# Patient Record
Sex: MALE | Race: WHITE | Employment: UNEMPLOYED | URBAN - METROPOLITAN AREA
[De-identification: names, ages, dates, MRNs, and addresses within clinical notes are randomized per-mention and may not be internally consistent; named-entity substitution may affect disease eponyms.]

---

## 2020-07-02 ENCOUNTER — HOSPITAL ENCOUNTER (OUTPATIENT)
Age: 35
Setting detail: SPECIMEN
Discharge: HOME | End: 2020-07-02
Payer: COMMERCIAL

## 2020-07-02 LAB
ABSOLUTE EOS #: 0.59 K/UL (ref 0–0.44)
ABSOLUTE IMMATURE GRANULOCYTE: 0.05 K/UL (ref 0–0.3)
ABSOLUTE LYMPH #: 2.45 K/UL (ref 1.1–3.7)
ABSOLUTE MONO #: 0.86 K/UL (ref 0.1–1.2)
ALBUMIN SERPL-MCNC: 4 G/DL (ref 3.5–5.2)
ALBUMIN/GLOBULIN RATIO: 1.2 (ref 1–2.5)
ALP BLD-CCNC: 129 U/L (ref 40–129)
ALT SERPL-CCNC: 80 U/L (ref 5–41)
ANION GAP SERPL CALCULATED.3IONS-SCNC: 15 MMOL/L (ref 9–17)
AST SERPL-CCNC: 72 U/L
BASOPHILS # BLD: 1 % (ref 0–2)
BASOPHILS ABSOLUTE: 0.1 K/UL (ref 0–0.2)
BILIRUB SERPL-MCNC: 0.39 MG/DL (ref 0.3–1.2)
BUN BLDV-MCNC: 15 MG/DL (ref 6–20)
BUN/CREAT BLD: ABNORMAL (ref 9–20)
CALCIUM SERPL-MCNC: 9 MG/DL (ref 8.6–10.4)
CHLORIDE BLD-SCNC: 97 MMOL/L (ref 98–107)
CO2: 20 MMOL/L (ref 20–31)
CREAT SERPL-MCNC: 0.69 MG/DL (ref 0.7–1.2)
DIFFERENTIAL TYPE: ABNORMAL
EOSINOPHILS RELATIVE PERCENT: 7 % (ref 1–4)
GFR AFRICAN AMERICAN: >60 ML/MIN
GFR NON-AFRICAN AMERICAN: >60 ML/MIN
GFR SERPL CREATININE-BSD FRML MDRD: ABNORMAL ML/MIN/{1.73_M2}
GFR SERPL CREATININE-BSD FRML MDRD: ABNORMAL ML/MIN/{1.73_M2}
GLUCOSE BLD-MCNC: 264 MG/DL (ref 70–99)
HCT VFR BLD CALC: 51.7 % (ref 40.7–50.3)
HEMOGLOBIN: 16.3 G/DL (ref 13–17)
IMMATURE GRANULOCYTES: 1 %
LYMPHOCYTES # BLD: 28 % (ref 24–43)
MCH RBC QN AUTO: 29.7 PG (ref 25.2–33.5)
MCHC RBC AUTO-ENTMCNC: 31.5 G/DL (ref 28.4–34.8)
MCV RBC AUTO: 94.3 FL (ref 82.6–102.9)
MONOCYTES # BLD: 10 % (ref 3–12)
NRBC AUTOMATED: 0 PER 100 WBC
PDW BLD-RTO: 12.7 % (ref 11.8–14.4)
PLATELET # BLD: ABNORMAL K/UL (ref 138–453)
PLATELET ESTIMATE: ABNORMAL
PLATELET, FLUORESCENCE: 185 K/UL (ref 138–453)
PLATELET, IMMATURE FRACTION: 3 % (ref 1.1–10.3)
PMV BLD AUTO: ABNORMAL FL (ref 8.1–13.5)
POTASSIUM SERPL-SCNC: 5.3 MMOL/L (ref 3.7–5.3)
RBC # BLD: 5.48 M/UL (ref 4.21–5.77)
RBC # BLD: ABNORMAL 10*6/UL
SEG NEUTROPHILS: 53 % (ref 36–65)
SEGMENTED NEUTROPHILS ABSOLUTE COUNT: 4.6 K/UL (ref 1.5–8.1)
SODIUM BLD-SCNC: 132 MMOL/L (ref 135–144)
TOTAL PROTEIN: 7.4 G/DL (ref 6.4–8.3)
WBC # BLD: 8.7 K/UL (ref 3.5–11.3)
WBC # BLD: ABNORMAL 10*3/UL

## 2020-12-27 ENCOUNTER — HOSPITAL ENCOUNTER (INPATIENT)
Age: 35
LOS: 4 days | Discharge: LEFT BEFORE DISCHARGE | DRG: 710 | End: 2020-12-31
Attending: EMERGENCY MEDICINE | Admitting: INTERNAL MEDICINE
Payer: MEDICARE

## 2020-12-27 DIAGNOSIS — G06.2 EPIDURAL ABSCESS: Primary | ICD-10-CM

## 2020-12-27 PROBLEM — E11.9 TYPE 2 DIABETES MELLITUS (HCC): Status: ACTIVE | Noted: 2020-12-27

## 2020-12-27 PROBLEM — F19.10 IV DRUG ABUSE (HCC): Status: ACTIVE | Noted: 2020-12-27

## 2020-12-27 LAB
-: ABNORMAL
ABSOLUTE EOS #: 0 K/UL (ref 0–0.4)
ABSOLUTE IMMATURE GRANULOCYTE: 0 K/UL (ref 0–0.3)
ABSOLUTE LYMPH #: 1.49 K/UL (ref 1–4.8)
ABSOLUTE MONO #: 1.66 K/UL (ref 0.1–0.8)
ALBUMIN SERPL-MCNC: 3 G/DL (ref 3.5–5.2)
ALBUMIN/GLOBULIN RATIO: 0.8 (ref 1–2.5)
ALP BLD-CCNC: 120 U/L (ref 40–129)
ALT SERPL-CCNC: 25 U/L (ref 5–41)
AMORPHOUS: ABNORMAL
ANION GAP SERPL CALCULATED.3IONS-SCNC: 15 MMOL/L (ref 9–17)
AST SERPL-CCNC: 17 U/L
BACTERIA: ABNORMAL
BASOPHILS # BLD: 1 % (ref 0–2)
BASOPHILS ABSOLUTE: 0.17 K/UL (ref 0–0.2)
BILIRUB SERPL-MCNC: 0.45 MG/DL (ref 0.3–1.2)
BILIRUBIN URINE: NEGATIVE
BUN BLDV-MCNC: 14 MG/DL (ref 6–20)
BUN/CREAT BLD: ABNORMAL (ref 9–20)
C-REACTIVE PROTEIN: 85.4 MG/L (ref 0–5)
CALCIUM SERPL-MCNC: 8.5 MG/DL (ref 8.6–10.4)
CASTS UA: ABNORMAL /LPF (ref 0–8)
CHLORIDE BLD-SCNC: 94 MMOL/L (ref 98–107)
CO2: 22 MMOL/L (ref 20–31)
COLOR: YELLOW
CREAT SERPL-MCNC: 0.57 MG/DL (ref 0.7–1.2)
CRYSTALS, UA: ABNORMAL /HPF
CULTURE: NORMAL
DIFFERENTIAL TYPE: ABNORMAL
DIRECT EXAM: NORMAL
DIRECT EXAM: NORMAL
EOSINOPHILS RELATIVE PERCENT: 0 % (ref 1–4)
EPITHELIAL CELLS UA: ABNORMAL /HPF (ref 0–5)
ESTIMATED AVERAGE GLUCOSE: 312 MG/DL
GFR AFRICAN AMERICAN: >60 ML/MIN
GFR NON-AFRICAN AMERICAN: >60 ML/MIN
GFR SERPL CREATININE-BSD FRML MDRD: ABNORMAL ML/MIN/{1.73_M2}
GFR SERPL CREATININE-BSD FRML MDRD: ABNORMAL ML/MIN/{1.73_M2}
GLUCOSE BLD-MCNC: 265 MG/DL (ref 75–110)
GLUCOSE BLD-MCNC: 275 MG/DL (ref 75–110)
GLUCOSE BLD-MCNC: 301 MG/DL (ref 70–99)
GLUCOSE URINE: ABNORMAL
HBA1C MFR BLD: 12.5 % (ref 4–6)
HCT VFR BLD CALC: 40.2 % (ref 40.7–50.3)
HEMOGLOBIN: 13.4 G/DL (ref 13–17)
IMMATURE GRANULOCYTES: 0 %
INR BLD: 0.9
KETONES, URINE: ABNORMAL
LEUKOCYTE ESTERASE, URINE: NEGATIVE
LYMPHOCYTES # BLD: 9 % (ref 24–44)
Lab: NORMAL
Lab: NORMAL
MCH RBC QN AUTO: 29.3 PG (ref 25.2–33.5)
MCHC RBC AUTO-ENTMCNC: 33.3 G/DL (ref 28.4–34.8)
MCV RBC AUTO: 87.8 FL (ref 82.6–102.9)
MONOCYTES # BLD: 10 % (ref 1–7)
MORPHOLOGY: NORMAL
MUCUS: ABNORMAL
NITRITE, URINE: NEGATIVE
NRBC AUTOMATED: 0 PER 100 WBC
OTHER OBSERVATIONS UA: ABNORMAL
PARTIAL THROMBOPLASTIN TIME: 24 SEC (ref 20.5–30.5)
PDW BLD-RTO: 11.9 % (ref 11.8–14.4)
PH UA: 5.5 (ref 5–8)
PLATELET # BLD: 312 K/UL (ref 138–453)
PLATELET ESTIMATE: ABNORMAL
PMV BLD AUTO: 10.7 FL (ref 8.1–13.5)
POTASSIUM SERPL-SCNC: 4 MMOL/L (ref 3.7–5.3)
PROCALCITONIN: 0.28 NG/ML
PROTEIN UA: NEGATIVE
PROTHROMBIN TIME: 9.9 SEC (ref 9–12)
RBC # BLD: 4.58 M/UL (ref 4.21–5.77)
RBC # BLD: ABNORMAL 10*6/UL
RBC UA: ABNORMAL /HPF (ref 0–4)
RENAL EPITHELIAL, UA: ABNORMAL /HPF
SARS-COV-2, RAPID: NOT DETECTED
SARS-COV-2: NORMAL
SARS-COV-2: NORMAL
SEDIMENTATION RATE, ERYTHROCYTE: 64 MM (ref 0–15)
SEG NEUTROPHILS: 80 % (ref 36–66)
SEGMENTED NEUTROPHILS ABSOLUTE COUNT: 13.28 K/UL (ref 1.8–7.7)
SODIUM BLD-SCNC: 131 MMOL/L (ref 135–144)
SOURCE: NORMAL
SPECIFIC GRAVITY UA: 1.04 (ref 1–1.03)
SPECIMEN DESCRIPTION: NORMAL
SPECIMEN DESCRIPTION: NORMAL
TOTAL PROTEIN: 6.8 G/DL (ref 6.4–8.3)
TRICHOMONAS: ABNORMAL
TURBIDITY: CLEAR
URINE HGB: NEGATIVE
UROBILINOGEN, URINE: NORMAL
WBC # BLD: 16.6 K/UL (ref 3.5–11.3)
WBC # BLD: ABNORMAL 10*3/UL
WBC UA: ABNORMAL /HPF (ref 0–5)
YEAST: ABNORMAL

## 2020-12-27 PROCEDURE — 87086 URINE CULTURE/COLONY COUNT: CPT

## 2020-12-27 PROCEDURE — 87040 BLOOD CULTURE FOR BACTERIA: CPT

## 2020-12-27 PROCEDURE — 86403 PARTICLE AGGLUT ANTBDY SCRN: CPT

## 2020-12-27 PROCEDURE — 83605 ASSAY OF LACTIC ACID: CPT

## 2020-12-27 PROCEDURE — 86140 C-REACTIVE PROTEIN: CPT

## 2020-12-27 PROCEDURE — 85652 RBC SED RATE AUTOMATED: CPT

## 2020-12-27 PROCEDURE — U0002 COVID-19 LAB TEST NON-CDC: HCPCS

## 2020-12-27 PROCEDURE — 80053 COMPREHEN METABOLIC PANEL: CPT

## 2020-12-27 PROCEDURE — 6360000002 HC RX W HCPCS: Performed by: STUDENT IN AN ORGANIZED HEALTH CARE EDUCATION/TRAINING PROGRAM

## 2020-12-27 PROCEDURE — 87075 CULTR BACTERIA EXCEPT BLOOD: CPT

## 2020-12-27 PROCEDURE — 86920 COMPATIBILITY TEST SPIN: CPT

## 2020-12-27 PROCEDURE — 87186 SC STD MICRODIL/AGAR DIL: CPT

## 2020-12-27 PROCEDURE — 85025 COMPLETE CBC W/AUTO DIFF WBC: CPT

## 2020-12-27 PROCEDURE — 1200000000 HC SEMI PRIVATE

## 2020-12-27 PROCEDURE — 85610 PROTHROMBIN TIME: CPT

## 2020-12-27 PROCEDURE — 87070 CULTURE OTHR SPECIMN AEROBIC: CPT

## 2020-12-27 PROCEDURE — 83036 HEMOGLOBIN GLYCOSYLATED A1C: CPT

## 2020-12-27 PROCEDURE — 82947 ASSAY GLUCOSE BLOOD QUANT: CPT

## 2020-12-27 PROCEDURE — U0003 INFECTIOUS AGENT DETECTION BY NUCLEIC ACID (DNA OR RNA); SEVERE ACUTE RESPIRATORY SYNDROME CORONAVIRUS 2 (SARS-COV-2) (CORONAVIRUS DISEASE [COVID-19]), AMPLIFIED PROBE TECHNIQUE, MAKING USE OF HIGH THROUGHPUT TECHNOLOGIES AS DESCRIBED BY CMS-2020-01-R: HCPCS

## 2020-12-27 PROCEDURE — 2580000003 HC RX 258: Performed by: STUDENT IN AN ORGANIZED HEALTH CARE EDUCATION/TRAINING PROGRAM

## 2020-12-27 PROCEDURE — 84145 PROCALCITONIN (PCT): CPT

## 2020-12-27 PROCEDURE — 6360000002 HC RX W HCPCS: Performed by: INTERNAL MEDICINE

## 2020-12-27 PROCEDURE — 86850 RBC ANTIBODY SCREEN: CPT

## 2020-12-27 PROCEDURE — 99223 1ST HOSP IP/OBS HIGH 75: CPT | Performed by: INTERNAL MEDICINE

## 2020-12-27 PROCEDURE — 93005 ELECTROCARDIOGRAM TRACING: CPT

## 2020-12-27 PROCEDURE — 2580000003 HC RX 258: Performed by: INTERNAL MEDICINE

## 2020-12-27 PROCEDURE — 86900 BLOOD TYPING SEROLOGIC ABO: CPT

## 2020-12-27 PROCEDURE — 85730 THROMBOPLASTIN TIME PARTIAL: CPT

## 2020-12-27 PROCEDURE — 81001 URINALYSIS AUTO W/SCOPE: CPT

## 2020-12-27 PROCEDURE — 86901 BLOOD TYPING SEROLOGIC RH(D): CPT

## 2020-12-27 PROCEDURE — 87147 CULTURE TYPE IMMUNOLOGIC: CPT

## 2020-12-27 PROCEDURE — 99284 EMERGENCY DEPT VISIT MOD MDM: CPT

## 2020-12-27 PROCEDURE — 86341 ISLET CELL ANTIBODY: CPT

## 2020-12-27 PROCEDURE — 87205 SMEAR GRAM STAIN: CPT

## 2020-12-27 PROCEDURE — 99254 IP/OBS CNSLTJ NEW/EST MOD 60: CPT | Performed by: INTERNAL MEDICINE

## 2020-12-27 PROCEDURE — 6370000000 HC RX 637 (ALT 250 FOR IP): Performed by: STUDENT IN AN ORGANIZED HEALTH CARE EDUCATION/TRAINING PROGRAM

## 2020-12-27 RX ORDER — INSULIN GLARGINE 100 [IU]/ML
15 INJECTION, SOLUTION SUBCUTANEOUS NIGHTLY
Status: DISCONTINUED | OUTPATIENT
Start: 2020-12-27 | End: 2020-12-28

## 2020-12-27 RX ORDER — MAGNESIUM SULFATE 1 G/100ML
1 INJECTION INTRAVENOUS PRN
Status: DISCONTINUED | OUTPATIENT
Start: 2020-12-27 | End: 2020-12-31 | Stop reason: HOSPADM

## 2020-12-27 RX ORDER — MORPHINE SULFATE 4 MG/ML
2 INJECTION, SOLUTION INTRAMUSCULAR; INTRAVENOUS EVERY 4 HOURS PRN
Status: DISCONTINUED | OUTPATIENT
Start: 2020-12-27 | End: 2020-12-29

## 2020-12-27 RX ORDER — ONDANSETRON 2 MG/ML
4 INJECTION INTRAMUSCULAR; INTRAVENOUS EVERY 6 HOURS PRN
Status: DISCONTINUED | OUTPATIENT
Start: 2020-12-27 | End: 2020-12-31 | Stop reason: HOSPADM

## 2020-12-27 RX ORDER — PROMETHAZINE HYDROCHLORIDE 12.5 MG/1
12.5 TABLET ORAL EVERY 6 HOURS PRN
Status: DISCONTINUED | OUTPATIENT
Start: 2020-12-27 | End: 2020-12-31 | Stop reason: HOSPADM

## 2020-12-27 RX ORDER — PROMETHAZINE HYDROCHLORIDE 25 MG/ML
6.25 INJECTION, SOLUTION INTRAMUSCULAR; INTRAVENOUS ONCE
Status: COMPLETED | OUTPATIENT
Start: 2020-12-27 | End: 2020-12-27

## 2020-12-27 RX ORDER — NICOTINE POLACRILEX 4 MG
15 LOZENGE BUCCAL PRN
Status: DISCONTINUED | OUTPATIENT
Start: 2020-12-27 | End: 2020-12-31 | Stop reason: HOSPADM

## 2020-12-27 RX ORDER — POLYETHYLENE GLYCOL 3350 17 G/17G
17 POWDER, FOR SOLUTION ORAL DAILY PRN
Status: DISCONTINUED | OUTPATIENT
Start: 2020-12-27 | End: 2020-12-29

## 2020-12-27 RX ORDER — FENTANYL CITRATE 50 UG/ML
25 INJECTION, SOLUTION INTRAMUSCULAR; INTRAVENOUS
Status: DISCONTINUED | OUTPATIENT
Start: 2020-12-27 | End: 2020-12-31 | Stop reason: HOSPADM

## 2020-12-27 RX ORDER — 0.9 % SODIUM CHLORIDE 0.9 %
20 INTRAVENOUS SOLUTION INTRAVENOUS ONCE
Status: DISCONTINUED | OUTPATIENT
Start: 2020-12-27 | End: 2020-12-31 | Stop reason: HOSPADM

## 2020-12-27 RX ORDER — LANOLIN ALCOHOL/MO/W.PET/CERES
3 CREAM (GRAM) TOPICAL NIGHTLY PRN
Status: DISCONTINUED | OUTPATIENT
Start: 2020-12-27 | End: 2020-12-31 | Stop reason: HOSPADM

## 2020-12-27 RX ORDER — POTASSIUM CHLORIDE 20 MEQ/1
40 TABLET, EXTENDED RELEASE ORAL PRN
Status: DISCONTINUED | OUTPATIENT
Start: 2020-12-27 | End: 2020-12-31 | Stop reason: HOSPADM

## 2020-12-27 RX ORDER — SODIUM CHLORIDE 0.9 % (FLUSH) 0.9 %
10 SYRINGE (ML) INJECTION PRN
Status: DISCONTINUED | OUTPATIENT
Start: 2020-12-27 | End: 2020-12-31 | Stop reason: HOSPADM

## 2020-12-27 RX ORDER — DEXTROSE MONOHYDRATE 25 G/50ML
12.5 INJECTION, SOLUTION INTRAVENOUS PRN
Status: DISCONTINUED | OUTPATIENT
Start: 2020-12-27 | End: 2020-12-31 | Stop reason: HOSPADM

## 2020-12-27 RX ORDER — MORPHINE SULFATE 4 MG/ML
2 INJECTION, SOLUTION INTRAMUSCULAR; INTRAVENOUS EVERY 4 HOURS PRN
Status: DISCONTINUED | OUTPATIENT
Start: 2020-12-27 | End: 2020-12-27

## 2020-12-27 RX ORDER — SODIUM CHLORIDE 9 MG/ML
INJECTION, SOLUTION INTRAVENOUS CONTINUOUS
Status: DISCONTINUED | OUTPATIENT
Start: 2020-12-27 | End: 2020-12-27

## 2020-12-27 RX ORDER — SODIUM CHLORIDE 0.9 % (FLUSH) 0.9 %
10 SYRINGE (ML) INJECTION EVERY 12 HOURS SCHEDULED
Status: DISCONTINUED | OUTPATIENT
Start: 2020-12-27 | End: 2020-12-31 | Stop reason: HOSPADM

## 2020-12-27 RX ORDER — POTASSIUM CHLORIDE 7.45 MG/ML
10 INJECTION INTRAVENOUS PRN
Status: DISCONTINUED | OUTPATIENT
Start: 2020-12-27 | End: 2020-12-31 | Stop reason: HOSPADM

## 2020-12-27 RX ORDER — ACETAMINOPHEN 325 MG/1
650 TABLET ORAL EVERY 6 HOURS PRN
Status: DISCONTINUED | OUTPATIENT
Start: 2020-12-27 | End: 2020-12-31 | Stop reason: HOSPADM

## 2020-12-27 RX ORDER — SODIUM CHLORIDE 9 MG/ML
INJECTION, SOLUTION INTRAVENOUS CONTINUOUS
Status: DISCONTINUED | OUTPATIENT
Start: 2020-12-27 | End: 2020-12-28

## 2020-12-27 RX ORDER — ACETAMINOPHEN 650 MG/1
650 SUPPOSITORY RECTAL EVERY 6 HOURS PRN
Status: DISCONTINUED | OUTPATIENT
Start: 2020-12-27 | End: 2020-12-31 | Stop reason: HOSPADM

## 2020-12-27 RX ORDER — NICOTINE 21 MG/24HR
1 PATCH, TRANSDERMAL 24 HOURS TRANSDERMAL DAILY PRN
Status: DISCONTINUED | OUTPATIENT
Start: 2020-12-27 | End: 2020-12-31 | Stop reason: HOSPADM

## 2020-12-27 RX ORDER — DEXTROSE MONOHYDRATE 50 MG/ML
100 INJECTION, SOLUTION INTRAVENOUS PRN
Status: DISCONTINUED | OUTPATIENT
Start: 2020-12-27 | End: 2020-12-31 | Stop reason: HOSPADM

## 2020-12-27 RX ORDER — PANTOPRAZOLE SODIUM 40 MG/1
40 TABLET, DELAYED RELEASE ORAL
Status: DISCONTINUED | OUTPATIENT
Start: 2020-12-28 | End: 2020-12-30

## 2020-12-27 RX ADMIN — MORPHINE SULFATE 2 MG: 4 INJECTION, SOLUTION INTRAMUSCULAR; INTRAVENOUS at 20:44

## 2020-12-27 RX ADMIN — SODIUM CHLORIDE: 9 INJECTION, SOLUTION INTRAVENOUS at 10:11

## 2020-12-27 RX ADMIN — FENTANYL CITRATE 25 MCG: 50 INJECTION, SOLUTION INTRAMUSCULAR; INTRAVENOUS at 22:53

## 2020-12-27 RX ADMIN — SODIUM CHLORIDE, PRESERVATIVE FREE 10 ML: 5 INJECTION INTRAVENOUS at 10:12

## 2020-12-27 RX ADMIN — FENTANYL CITRATE 25 MCG: 50 INJECTION, SOLUTION INTRAMUSCULAR; INTRAVENOUS at 15:27

## 2020-12-27 RX ADMIN — Medication 3 MG: at 20:44

## 2020-12-27 RX ADMIN — Medication 1250 MG: at 18:57

## 2020-12-27 RX ADMIN — FENTANYL CITRATE 25 MCG: 50 INJECTION, SOLUTION INTRAMUSCULAR; INTRAVENOUS at 12:53

## 2020-12-27 RX ADMIN — ONDANSETRON 4 MG: 2 INJECTION INTRAMUSCULAR; INTRAVENOUS at 20:43

## 2020-12-27 RX ADMIN — CEFEPIME HYDROCHLORIDE 2 G: 2 INJECTION, POWDER, FOR SOLUTION INTRAVENOUS at 11:44

## 2020-12-27 RX ADMIN — PROMETHAZINE HYDROCHLORIDE 6.25 MG: 25 INJECTION INTRAMUSCULAR; INTRAVENOUS at 22:53

## 2020-12-27 ASSESSMENT — ENCOUNTER SYMPTOMS
DIARRHEA: 0
ABDOMINAL PAIN: 0
ABDOMINAL DISTENTION: 0
VOMITING: 0
BLOOD IN STOOL: 0
CONSTIPATION: 0
NAUSEA: 0
BACK PAIN: 1

## 2020-12-27 ASSESSMENT — PAIN SCALES - GENERAL
PAINLEVEL_OUTOF10: 9
PAINLEVEL_OUTOF10: 10
PAINLEVEL_OUTOF10: 8

## 2020-12-27 ASSESSMENT — PAIN DESCRIPTION - PAIN TYPE: TYPE: ACUTE PAIN

## 2020-12-27 ASSESSMENT — PAIN DESCRIPTION - LOCATION: LOCATION: BACK

## 2020-12-27 NOTE — ED NOTES
Labeled blood specimen collected and sent to lab via tube system.   COVID swab sent to lab     Ronn Weaver RN  12/27/20 3246

## 2020-12-27 NOTE — ED NOTES
Pt asking multiple times about eating. Writer informed pt that writer is attempting to get a hold of admitting team regarding inital NPO orders. Pt states \"I'm about ready to leave, its been 48 hours since I last ate anything\". Writer perfect served admitting team and awaiting response.       Oksana Fontanez RN  12/27/20 0635
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
## 2020-12-27 NOTE — CONSULTS
Neurosurgery Consult Note    Reason for Consult:  Epidural abscess     History Obtained From:  patient, electronic medical record    CHIEF COMPLAINT:       Back pain    HISTORY OF PRESENT ILLNESS:       The patient is a 38 y.o. male with history of IV drug abuse who presented as transfer from [facility] with thoracic epidural abscess with central canal narrowing and cord signal T10-T12. Patient has ongoing severe back pain over the last couple of days. He denies weakness numbness or difficulty walking. No fecal or urinary incontinence. No fever or chills. No headache. No confusion or altered mental status. PAST MEDICAL HISTORY :       Past Medical History:    No past medical history on file. Past Surgical History:    No past surgical history on file.     Social History:   Social History     Socioeconomic History    Marital status: Unknown     Spouse name: Not on file    Number of children: Not on file    Years of education: Not on file    Highest education level: Not on file   Occupational History    Not on file   Social Needs    Financial resource strain: Not on file    Food insecurity     Worry: Not on file     Inability: Not on file    Transportation needs     Medical: Not on file     Non-medical: Not on file   Tobacco Use    Smoking status: Not on file   Substance and Sexual Activity    Alcohol use: Not on file    Drug use: Not on file    Sexual activity: Not on file   Lifestyle    Physical activity     Days per week: Not on file     Minutes per session: Not on file    Stress: Not on file   Relationships    Social connections     Talks on phone: Not on file     Gets together: Not on file     Attends Mormon service: Not on file     Active member of club or organization: Not on file     Attends meetings of clubs or organizations: Not on file     Relationship status: Not on file    Intimate partner violence Fear of current or ex partner: Not on file     Emotionally abused: Not on file     Physically abused: Not on file     Forced sexual activity: Not on file   Other Topics Concern    Not on file   Social History Narrative    Not on file       Family History:   No family history on file. Allergies:  Patient has no known allergies.     Home Medications:  Prior to Admission medications    Not on File       Current Medications:   Current Facility-Administered Medications: sodium chloride flush 0.9 % injection 10 mL, 10 mL, Intravenous, 2 times per day  sodium chloride flush 0.9 % injection 10 mL, 10 mL, Intravenous, PRN  potassium chloride (KLOR-CON M) extended release tablet 40 mEq, 40 mEq, Oral, PRN **OR** potassium bicarb-citric acid (EFFER-K) effervescent tablet 40 mEq, 40 mEq, Oral, PRN **OR** potassium chloride 10 mEq/100 mL IVPB (Peripheral Line), 10 mEq, Intravenous, PRN  magnesium sulfate 1 g in dextrose 5% 100 mL IVPB, 1 g, Intravenous, PRN  enoxaparin (LOVENOX) injection 40 mg, 40 mg, Subcutaneous, Daily  promethazine (PHENERGAN) tablet 12.5 mg, 12.5 mg, Oral, Q6H PRN **OR** ondansetron (ZOFRAN) injection 4 mg, 4 mg, Intravenous, Q6H PRN  polyethylene glycol (GLYCOLAX) packet 17 g, 17 g, Oral, Daily PRN  nicotine (NICODERM CQ) 21 MG/24HR 1 patch, 1 patch, Transdermal, Daily PRN  acetaminophen (TYLENOL) tablet 650 mg, 650 mg, Oral, Q6H PRN **OR** acetaminophen (TYLENOL) suppository 650 mg, 650 mg, Rectal, Q6H PRN  vancomycin (VANCOCIN) 1250 mg in dextrose 5 % 250 mL IVPB, 15 mg/kg, Intravenous, Q12H  0.9 % sodium chloride infusion, , Intravenous, Continuous  fentaNYL (SUBLIMAZE) injection 25 mcg, 25 mcg, Intravenous, Q2H PRN  morphine injection 2 mg, 2 mg, Intravenous, Q4H PRN  cefepime (MAXIPIME) 2 g IVPB minibag, 2 g, Intravenous, Q8H  0.9 % sodium chloride bolus, 20 mL, Intravenous, Once        PHYSICAL EXAM: /77   Pulse 67   Temp 98.1 °F (36.7 °C) (Oral)   Resp 18   Ht 5' 8\" (1.727 m)   Wt 169 lb (76.7 kg)   SpO2 100%   BMI 25.70 kg/m²       Neurologic Exam     Mental Status   Oriented to person, place, and time. Cranial Nerves   Cranial nerves II through XII intact. Motor Exam     Strength   Strength 5/5 throughout.      Sensory Exam   Right arm light touch: normal  Left arm light touch: normal  Right arm pinprick: normal  Left arm pinprick: normal  No saddle anesthesia  Normal rectal tone      Gait, Coordination, and Reflexes     Reflexes   Right biceps: 2+  Left biceps: 2+  Right triceps: 2+  Left triceps: 2+  Right patellar: 2+  Left patellar: 2+  Right achilles: 2+  Left achilles: 2+  Right plantar: normal  Left plantar: normal  Right Vuong: absent  Left Vuong: absent  Right ankle clonus: absent  Left ankle clonus: absent             LABS AND IMAGING:     CBC with Differential:    Lab Results   Component Value Date    WBC 16.6 12/27/2020    RBC 4.58 12/27/2020    HGB 13.4 12/27/2020    HCT 40.2 12/27/2020     12/27/2020    MCV 87.8 12/27/2020    MCH 29.3 12/27/2020    MCHC 33.3 12/27/2020    RDW 11.9 12/27/2020    LYMPHOPCT 9 12/27/2020    MONOPCT 10 12/27/2020    BASOPCT 1 12/27/2020    MONOSABS 1.66 12/27/2020    LYMPHSABS 1.49 12/27/2020    EOSABS 0.00 12/27/2020    BASOSABS 0.17 12/27/2020    DIFFTYPE NOT REPORTED 12/27/2020     BMP:    Lab Results   Component Value Date     12/27/2020    K 4.0 12/27/2020    CL 94 12/27/2020    CO2 22 12/27/2020    BUN 14 12/27/2020    LABALBU 3.0 12/27/2020    CREATININE 0.57 12/27/2020    CALCIUM 8.5 12/27/2020    GFRAA >60 12/27/2020    LABGLOM >60 12/27/2020    GLUCOSE 301 12/27/2020       Radiology Review: MRI T-spine: T10-T11 epidural collection with central canal narrowing with increased cord signal concerning for cord compression      ASSESSMENT AND PLAN:       Thoracic epidural abscess MRI concerning for cord compression. No clinical signs of myelopathy    N.p.o. after midnight for surgical decompression  ID consult for antibiotic management  Internal medicine for surgical clearance      Please contact neurosurgery with any changes in patient's neurological status, any questions or concerns. Brittany Dominique  Neurology Resident, PGY-3       Electronically signed by Renee Wilkerson MD on 12/27/20 at 11:20 AM EST      This note is created with the assistance of a speech recognition program.  While intending to generate a document that actually reflects the content of the visit, the document can still have some errors including those of syntax and sound a like substitutions which may escape proof reading. In such instances, actual meaning can be extrapolated by contextual diversion.

## 2020-12-27 NOTE — ED NOTES
Labeled urine specimen collected and sent to lab via tube system.        Venkata Willams RN  12/27/20 7473

## 2020-12-27 NOTE — ED PROVIDER NOTES
[facility]     Emergency Department     Faculty Attestation    I performed a history and physical examination of the patient and discussed management with the resident. I reviewed the resident´s note and agree with the documented findings and plan of care. Any areas of disagreement are noted on the chart. I was personally present for the key portions of any procedures. I have documented in the chart those procedures where I was not present during the key portions. I have reviewed the emergency nurses triage note. I agree with the chief complaint, past medical history, past surgical history, allergies, medications, social and family history as documented unless otherwise noted below. For Physician Assistant/ Nurse Practitioner cases/documentation I have personally evaluated this patient and have completed at least one if not all key elements of the E/M (history, physical exam, and MDM). Additional findings are as noted. Transfer note:  [place] Officer   1985  37 yo male   Known IVDA   Kidney stone for a few days. Went to another ED then   Complaining of severe back pain  Put central line while sitting   Epidural abscess T9-T11 w/ cord compression w/ septic arthritis   IJ   WBC of 14 and CRP of 9  Del and Gaurav Herrera MD  12/27/20 0735    Chest clear, heart exam normal without murmurs, good muscle strength in the lower extremities, pupils 3 mm and reactive. Awake and alert, good airway.        Ann Herrera MD  12/27/20 9948       EKG Interpretation    Interpreted by emergency department physician    Rhythm: normal sinus   Rate: normal/67  Axis: normal  Ectopy: none  Conduction: Short NH of 96 ms without delta wave  ST Segments: no acute change  T Waves: no acute change  Q Waves: none    Clinical Impression: Normal EKG except for short NH    Ann Herrera, III       Ann Herrera MD  12/27/20 4036

## 2020-12-27 NOTE — ED NOTES
Elizabeth León   1985  39 yo male   Known IVDA   Kidney stone for a few days.   Went to another ED then   Complaining of severe back pain  Put central line while sitting   Epidural abscess T9-T11 w/ cord compression w/ septic arthritis   IJ   WBC of 14 and CRP of 9  Del and Gaurav     Report received and written by Dr Crystal Mckeon, RN  12/27/20 5928

## 2020-12-27 NOTE — CONSULTS
The patient had blood cultures obtained and he has been placed on Zosyn and vancomycin pending further data. At [facility]. [facility]ik's he is being continued on vancomycin on place on cefepime. He has a prior history of MRSA. His Covid test was negative on 12/27/2020. Labs, X rays reviewed: 12/27/2020    BUN: 14  Cr:0.57    WBC: 16.6  Hb: 13.4  Plat: 312    Cultures:  Urine:  · 12-27-20 pending  Blood:  · 12-27-20 pending  Sputum :  ·   Wound:  ·     Discussed with patient, RN, NS. I have personally reviewed the past medical history, past surgical history, medications, social history, and family history, and I have updated the database accordingly. Past Medical History:   No past medical history on file. Past Surgical  History:   No past surgical history on file.     Medications:      sodium chloride flush  10 mL Intravenous 2 times per day    enoxaparin  40 mg Subcutaneous Daily    vancomycin  15 mg/kg Intravenous Q12H    cefepime  2 g Intravenous Q8H    sodium chloride  20 mL Intravenous Once       Social History:     Social History     Socioeconomic History    Marital status: Unknown     Spouse name: Not on file    Number of children: Not on file    Years of education: Not on file    Highest education level: Not on file   Occupational History    Not on file   Social Needs    Financial resource strain: Not on file    Food insecurity     Worry: Not on file     Inability: Not on file    Transportation needs     Medical: Not on file     Non-medical: Not on file   Tobacco Use    Smoking status: Not on file   Substance and Sexual Activity    Alcohol use: Not on file    Drug use: Not on file    Sexual activity: Not on file   Lifestyle    Physical activity     Days per week: Not on file     Minutes per session: Not on file    Stress: Not on file   Relationships    Social connections     Talks on phone: Not on file     Gets together: Not on file     Attends Gnosticism service: Not on file Active member of club or organization: Not on file     Attends meetings of clubs or organizations: Not on file     Relationship status: Not on file    Intimate partner violence     Fear of current or ex partner: Not on file     Emotionally abused: Not on file     Physically abused: Not on file     Forced sexual activity: Not on file   Other Topics Concern    Not on file   Social History Narrative    Not on file       Family History:   No family history on file. Allergies:   Patient has no known allergies. Review of Systems:   Constitutional: No fevers or chills. No systemic complaints  Head: No headaches  Eyes: No double vision or blurry vision. No conjunctival inflammation. ENT: No sore throat or runny nose. . No hearing loss, tinnitus or vertigo. Cardiovascular: No chest pain or palpitations. No shortness of breath. No ALMEIDA  Lung: No shortness of breath or cough. No sputum production  Abdomen: No nausea, vomiting, diarrhea, or abdominal pain. Wava Prim No cramps. Genitourinary: No increased urinary frequency, or dysuria. No hematuria. No suprapubic or CVA pain  Musculoskeletal: No muscle aches or pains. No joint effusions, swelling or deformities. Back pain in the thoracic area  Hematologic: No bleeding or bruising. Neurologic: No headache, weakness, numbness, or tingling. Integument: No rash, no ulcers. Psychiatric: No depression. Endocrine: No polyuria, no polydipsia, no polyphagia.     Physical Examination :     Patient Vitals for the past 8 hrs:   BP Temp Temp src Pulse Resp SpO2 Height Weight   12/27/20 1116 (!) 153/83   70  96 %     12/27/20 1046 (!) 151/85   71 29 98 %     12/27/20 0948 (!) 142/87   68 18 96 %     12/27/20 0846 (!) 152/91   72 25 98 %     12/27/20 0816 136/74   68 18 99 %     12/27/20 0746 122/76   74 25 98 %     12/27/20 0742 133/77 98.1 °F (36.7 °C) Oral 67 18 100 % 5' 8\" (1.727 m) 169 lb (76.7 kg) General Appearance: Awake, alert, and in no apparent distress  Head:  Normocephalic, no trauma  Eyes: Pupils equal, round, reactive to light and accommodation; extraocular movements intact; sclera anicteric; conjunctivae pink. No embolic phenomena. ENT: Oropharynx clear, without erythema, exudate, or thrush. No tenderness of sinuses. Mouth/throat: mucosa pink and moist. No lesions. Dentition in good repair. Neck:Supple, without lymphadenopathy. Thyroid normal, No bruits. Pulmonary/Chest: Clear to auscultation, without wheezes, rales, or rhonchi. No dullness to percussion. Cardiovascular: Regular rate and rhythm without murmurs, rubs, or gallops. Abdomen: Soft, non tender. Bowel sounds normal. No organomegaly  All four Extremities: No cyanosis, clubbing, edema, or effusions. Neurologic: No gross sensory or motor deficits. Skin: Warm and dry with good turgor. No signs of peripheral arterial or venous insufficiency. No ulcerations. No open wounds. Medical Decision Making -Laboratory:   I have independently reviewed/ordered the following labs:    CBC with Differential:   Recent Labs     12/27/20 0812   WBC 16.6*   HGB 13.4   HCT 40.2*      LYMPHOPCT 9*   MONOPCT 10*     BMP:   Recent Labs     12/27/20  0812   *   K 4.0   CL 94*   CO2 22   BUN 14   CREATININE 0.57*     Hepatic Function Panel:   Recent Labs     12/27/20  0812   PROT 6.8   LABALBU 3.0*   BILITOT 0.45   ALKPHOS 120   ALT 25   AST 17     No results for input(s): RPR in the last 72 hours. No results for input(s): HIV in the last 72 hours. No results for input(s): BC in the last 72 hours.   Lab Results   Component Value Date    MUCUS NOT REPORTED 12/27/2020    RBC 4.58 12/27/2020    TRICHOMONAS NOT REPORTED 12/27/2020    WBC 16.6 12/27/2020    YEAST NOT REPORTED 12/27/2020    TURBIDITY CLEAR 12/27/2020     Lab Results   Component Value Date    CREATININE 0.57 12/27/2020    GLUCOSE 301 12/27/2020 Medical Decision Making-Imaging:       Medical Decision [id number]:       Medical Decision Making-Other:     Note:  · Labs, medications, radiologic studies were reviewed with personal review of films  · Large amounts of data were reviewed  · Discussed with nursing Staff, Discharge planner  · Infection Control and Prevention measures reviewed  · All prior entries were reviewed  · Administer medications as ordered  · Prognosis: Fair  · Discharge planning reviewed  · Follow up as outpatient. Thank you for allowing us to participate in the care of this patient. Please call with questions.     Rosie Seals MD  Pager: [phone] - Office: [phone]

## 2020-12-27 NOTE — PROGRESS NOTES
Senior note:    79-year-old past medical history significant for type 2 diabetes mellitus, IV drug user, hypertension presented initially to [facility] with acute persistent back pain. MRI spine suggestive of L4-L5 disc protrusion, bilateral foraminotomy stenosis. T8-T9, T9-T10 septic arthritis leading to epidural abscess from T9-T11. And patient was transferred to [facility] for further care. In the ER patient was wincing in pain. Vitals stable, afebrile heart rate in 60s. Neurosurgery was consulted for epidural abscess. Pertinent labs elevated ESR, CRP. A1c 12.5, blood sugar in 300s. Patient takes glipizide and Metformin at home, unsure if he is compliant or not. Present on Admission:   Epidural abscess   Type 2 diabetes mellitus ([facility] [address]   IV drug abuse ([facility] [address]      Plan:  1. We will start the patient on IV vancomycin and cefepime for broader coverage until cultures come back. Consulted ID, and they agree with the same. 2. Continue IV NS infusion at 100 mL/h.  3. Fentanyl and morphine as needed for better pain control. 4. We will start the patient on 15 units Lantus for tighter estimate control and adjust accordingly. He will be n.p.o. after midnight  5. Neurosurgery taking the patient tomorrow for surgical decompression.     Jeyson Bustamante MD  PGY-2 Internal Medicine Resident  [address] [place] [address]  12/27/2020 7:15 PM

## 2020-12-27 NOTE — ED NOTES
Pt asking about phone and other personal belongings. Writer called [facility] and spoke with staff who stated that belongings were accidentally left there and that they locked them up. [facility] contacted pts mother regarding belongings and she agreed to pick them up for patient. Writer called pts mother and confirmed that belongings were going to be picked up by her. Writer informed pt of this and verbalized understanding.            Mable Vaughn RN  12/27/20 5249

## 2020-12-27 NOTE — PROGRESS NOTES
Pharmacy Note  Vancomycin Consult    Macario Bobby is a 38 y.o. male started on Vancomycin for SSTI; consult received from Dr. Dani Gupta to manage therapy. Also receiving the following antibiotics: cefepime. Patient Active Problem List   Diagnosis    Epidural abscess       Allergies:  Patient has no known allergies. Temp max: 98.1    Recent Labs     12/27/20  0812   BUN 14       Recent Labs     12/27/20  0812   CREATININE 0.57*       Recent Labs     12/27/20  0812   WBC 16.6*       No intake or output data in the 24 hours ending 12/27/20 1725    Culture Date      Source                       Results  12/27                   blood  12/27                   wound                         gram pos cocci clusters    Ht Readings from Last 1 Encounters:   12/27/20 5' 8\" (1.727 m)        Wt Readings from Last 1 Encounters:   12/27/20 169 lb (76.7 kg)         Body mass index is 25.7 kg/m². Estimated Creatinine Clearance: 173 mL/min (A) (based on SCr of 0.57 mg/dL (L)). Goal Trough Level: 15-20 mcg/mL    Assessment/Plan:  Will initiate Vancomycin with a one time loading dose of 1500 mg x1, followed by 1250 mg IV every 12 hours. Timing of trough level will be determined based on culture results, renal function, and clinical response. Thank you for the consult. Will continue to follow.

## 2020-12-27 NOTE — ED NOTES
Bed: 28  Expected date:   Expected time:   Means of arrival:   Comments:  Donna Bass, RN  12/27/20 7677

## 2020-12-27 NOTE — ED NOTES
Pt talking on phone in room, updated on plan of care. Informed about NPO status effective now. Denies any other needs at this time. Will continue to monitor.       Dc Castillo RN  12/27/20 9914

## 2020-12-27 NOTE — ED NOTES
Notified phlebotomy for blood culture draw. They stated that they will try to find somebody to come draw cultures.       Geoff Ayers, RN  12/27/20 9253

## 2020-12-27 NOTE — H&P
[address]     Department of Internal Medicine - Staff Internal Medicine Teaching Service          ADMISSION NOTE/HISTORY AND PHYSICAL EXAMINATION   Date: 12/27/2020  Patient Name: Jun Groves  Date of admission: 12/27/2020  7:38 AM  YOB: 1985  PCP: Milton Mcintyre MD  History Obtained From:  patient, electronic medical record    CHIEF COMPLAINT     Chief complaint: Lower back pain    HISTORY OF PRESENTING ILLNESS     The patient is a pleasant 38 y.o. male PMH significant for diabetes mellitus type 2, IV drug use presents with a chief complaint of lower back pain for past several days. According to the patient, he started having lower back pain 3 to 4 days back, but not associated with any fevers, chills, numbness, tingling, weakness of bilateral lower legs. He initially went to [facility] ED, was transferred to [facility] and [facility] upon MRI with contrast finding of T10-T12 spinal epidural fluid collection. He denied any saddle anesthesia, urinary or fecal incontinence. Patient states that he last used IV drugs in 2014. In the ED, patient was hypertensive /87, HR 68, saturating 99% on room air. Labs were unremarkable except for hyperglycemia blood glucose 301, elevated WBC count 16.6, elevated procalcitonin 0.28, elevated CRP 85.4. Hemoglobin A1c 12.5. ESR 64. Wound culture ordered. Direct exam showed many gram-positive cocci in clusters. On physical exam, patient has strength 5/5 in all 4 extremities, sensation intact, cranial nerves grossly intact. Review of Systems:  Review of Systems   Constitutional: Positive for fever. Negative for activity change, appetite change, chills, diaphoresis, fatigue and unexpected weight change. Cardiovascular: Negative for chest pain, palpitations and leg swelling. Gastrointestinal: Negative for abdominal distention, abdominal pain, blood in stool, constipation, diarrhea, nausea and vomiting. Endocrine: Negative. Musculoskeletal: Positive for back pain. Skin: Negative. Neurological: Negative. Hematological: Negative. Psychiatric/Behavioral: Negative. PAST MEDICAL HISTORY     No past medical history on file. PAST SURGICAL HISTORY     No past surgical history on file. ALLERGIES     Patient has no known allergies. MEDICATIONS PRIOR TO ADMISSION     Prior to Admission medications    Not on File       SOCIAL HISTORY     Tobacco: Patient smokes but refused to reply  Alcohol: Social drinking only  Illicits: History of IV drug use  Occupation:     FAMILY HISTORY     No family history on file. PHYSICAL EXAM     Vitals: BP (!) 153/83   Pulse 70   Temp 98.1 °F (36.7 °C) (Oral)   Resp 29   Ht 5' 8\" (1.727 m)   Wt 169 lb (76.7 kg)   SpO2 96%   BMI 25.70 kg/m²   Tmax: Temp (24hrs), Av.1 °F (36.7 °C), Min:98.1 °F (36.7 °C), Max:98.1 °F (36.7 °C)    Last Body weight:   Wt Readings from Last 3 Encounters:   20 169 lb (76.7 kg)     Body Mass Index : Body mass index is 25.7 kg/m². PHYSICAL EXAMINATION:  Constitutional: This is a well developed, well nourished, 25-29.9 - Overweight 38y.o. year old male who is alert, oriented, cooperative and in no apparent distress. Head:normocephalic and atraumatic. EENT:  PERRLA. No conjunctival injections. Septum was midline, mucosa was without erythema, exudates or cobblestoning. No thrush was noted. Neck: Supple without thyromegaly. No elevated JVP. Trachea was midline. Respiratory: Chest was symmetrical without dullness to percussion. Breath sounds bilaterally were clear to auscultation. There were no wheezes, rhonchi or rales. There is no intercostal retraction or use of accessory muscles. No egophony noted. Cardiovascular: Regular without murmur, clicks, gallops or rubs. Abdomen: Slight tenderness in right and left flank regions. Lymphatic: No lymphadenopathy. Musculoskeletal: Normal curvature of the spine. No gross muscle weakness. Extremities:  No lower extremity edema, ulcerations, tenderness, varicosities or erythema. Muscle size, tone and strength are normal.  No involuntary movements are noted. Skin:  Warm and dry. Good color, turgor and pigmentation. No lesions or scars. No cyanosis or clubbing  Neurological/Psychiatric: The patient's general behavior, level of consciousness, thought content and emotional status is normal.          INVESTIGATIONS     Laboratory Testing:     Recent Results (from the past 24 hour(s))   TYPE AND SCREEN    Collection Time: 12/27/20  8:11 AM   Result Value Ref Range    Expiration Date 12/30/2020,2359     Arm Band Number [id number]     ABO/Rh A POSITIVE     Antibody Screen NEGATIVE     Unit Number [id number]     Product Code Leukocyte Reduced Red Cell     Unit Divison 00     Dispense Status ALLOCATED     Transfusion Status OK TO TRANSFUSE     Crossmatch Result COMPATIBLE     Unit Number [id number]     Product Code Leukocyte Reduced Red Cell     Unit Divison 00     Dispense Status ALLOCATED     Transfusion Status OK TO TRANSFUSE     Crossmatch Result COMPATIBLE    COVID-19    Collection Time: 12/27/20  8:11 AM    Specimen: Other   Result Value Ref Range    SARS-CoV-2          SARS-CoV-2, Rapid Not Detected Not Detected    Source . NASOPHARYNGEAL SWAB     SARS-CoV-2         CBC Auto Differential    Collection Time: 12/27/20  8:12 AM   Result Value Ref Range    WBC 16.6 (H) 3.5 - 11.3 k/uL    RBC 4.58 4.21 - 5.77 m/uL    Hemoglobin 13.4 13.0 - 17.0 g/dL    Hematocrit 40.2 (L) 40.7 - 50.3 %    MCV 87.8 82.6 - 102.9 fL    MCH 29.3 25.2 - 33.5 pg    MCHC 33.3 28.4 - 34.8 g/dL    RDW 11.9 11.8 - 14.4 %    Platelets 460 097 - 102 k/uL    MPV 10.7 8.1 - 13.5 fL    NRBC Automated 0.0 0.0 per 100 WBC    Differential Type NOT REPORTED     WBC Morphology NOT REPORTED     RBC Morphology NOT REPORTED     Platelet Estimate NOT REPORTED Immature Granulocytes 0 0 %    Seg Neutrophils 80 (H) 36 - 66 %    Lymphocytes 9 (L) 24 - 44 %    Monocytes 10 (H) 1 - 7 %    Eosinophils % 0 (L) 1 - 4 %    Basophils 1 0 - 2 %    Absolute Immature Granulocyte 0.00 0.00 - 0.30 k/uL    Segs Absolute 13.28 (H) 1.8 - 7.7 k/uL    Absolute Lymph # 1.49 1.0 - 4.8 k/uL    Absolute Mono # 1.66 (H) 0.1 - 0.8 k/uL    Absolute Eos # 0.00 0.0 - 0.4 k/uL    Basophils Absolute 0.17 0.0 - 0.2 k/uL    Morphology Normal    Comprehensive Metabolic Panel w/ Reflex to MG    Collection Time: 12/27/20  8:12 AM   Result Value Ref Range    Glucose 301 (H) 70 - 99 mg/dL    BUN 14 6 - 20 mg/dL    CREATININE 0.57 (L) 0.70 - 1.20 mg/dL    Bun/Cre Ratio NOT REPORTED 9 - 20    Calcium 8.5 (L) 8.6 - 10.4 mg/dL    Sodium 131 (L) 135 - 144 mmol/L    Potassium 4.0 3.7 - 5.3 mmol/L    Chloride 94 (L) 98 - 107 mmol/L    CO2 22 20 - 31 mmol/L    Anion Gap 15 9 - 17 mmol/L    Alkaline Phosphatase 120 40 - 129 U/L    ALT 25 5 - 41 U/L    AST 17 <40 U/L    Total Bilirubin 0.45 0.3 - 1.2 mg/dL    Total Protein 6.8 6.4 - 8.3 g/dL    Alb 3.0 (L) 3.5 - 5.2 g/dL    Albumin/Globulin Ratio 0.8 (L) 1.0 - 2.5    GFR Non-African American >60 >60 mL/min    GFR African American >60 >60 mL/min    GFR Comment          GFR Staging NOT REPORTED    Protime-INR    Collection Time: 12/27/20  8:12 AM   Result Value Ref Range    Protime 9.9 9.0 - 12.0 sec    INR 0.9    APTT    Collection Time: 12/27/20  8:12 AM   Result Value Ref Range    PTT 24.0 20.5 - 30.5 sec   Sedimentation Rate    Collection Time: 12/27/20  8:12 AM   Result Value Ref Range    Sed Rate 64 (H) 0 - 15 mm   C-Reactive Protein    Collection Time: 12/27/20  8:12 AM   Result Value Ref Range    CRP 85.4 (H) 0.0 - 5.0 mg/L   Hemoglobin A1C    Collection Time: 12/27/20  8:12 AM   Result Value Ref Range    Hemoglobin A1C 12.5 (H) 4.0 - 6.0 %    Estimated Avg Glucose 312 mg/dL   Procalcitonin    Collection Time: 12/27/20  8:12 AM   Result Value Ref Range Procalcitonin 0.28 (H) <0.09 ng/mL   Wound Gram stain    Collection Time: 12/27/20 10:09 AM    Specimen: Wound; No Site Given   Result Value Ref Range    Specimen Description . WOUND     Special Requests LEFT FLANK     Direct Exam DUPLICATE ORDER    Wound Culture    Collection Time: 12/27/20 10:10 AM    Specimen: Skin; No Site Given   Result Value Ref Range    Specimen Description . SKIN     Special Requests LEFT FLANK SWAB     Direct Exam FEW NEUTROPHILS (A)     Direct Exam MANY GRAM POSITIVE COCCI IN CLUSTERS (A)     Culture PENDING    Culture, Anaerobic    Collection Time: 12/27/20 10:10 AM    Specimen: Wound   Result Value Ref Range    Specimen Description . WOUND     Special Requests LEFT FLANK SWAB     Direct Exam NOT REPORTED     Culture       DUE TO THE SPECIMEN TYPE, THE ORDER WAS CANCELED AND REORDERED. PLEASE REFER TO: AEROBIC CULTURE   URINALYSIS WITH MICROSCOPIC    Collection Time: 12/27/20 11:49 AM   Result Value Ref Range    Color, UA YELLOW YELLOW    Turbidity UA CLEAR CLEAR    Glucose, Ur 3+ (A) NEGATIVE    Bilirubin Urine NEGATIVE NEGATIVE    Ketones, Urine LARGE (A) NEGATIVE    Specific Gravity, UA 1.041 (H) 1.005 - 1.030    Urine Hgb NEGATIVE NEGATIVE    pH, UA 5.5 5.0 - 8.0    Protein, UA NEGATIVE NEGATIVE    Urobilinogen, Urine Normal Normal    Nitrite, Urine NEGATIVE NEGATIVE    Leukocyte Esterase, Urine NEGATIVE NEGATIVE    -          WBC, UA None 0 - 5 /HPF    RBC, UA 0 TO 2 0 - 4 /HPF    Casts UA NOT REPORTED 0 - 8 /LPF    Crystals, UA NOT REPORTED None /HPF    Epithelial Cells UA 0 TO 2 0 - 5 /HPF    Renal Epithelial, UA NOT REPORTED 0 /HPF    Bacteria, UA NOT REPORTED None    Mucus, UA NOT REPORTED None    Trichomonas, UA NOT REPORTED None    Amorphous, UA NOT REPORTED None    Other Observations UA NOT REPORTED NOT REQ. Yeast, UA NOT REPORTED None       Imaging:   No results found.     ASSESSMENT & PLAN     ASSESSMENT / PLAN:     IMPRESSION This is a 38 y.o. male who presented with lower back pain and found to have final epidural abscess. Patient admitted to inpatient status because septic arthritis leading to spinal epidural abscess from T9-T11. Principal Problem:    Epidural abscess  Active Problems:    Type 2 diabetes mellitus ([facility] [address]    IV drug abuse ([facility] [address]  Resolved Problems:    * No resolved hospital problems. *    Spinal epidural abscess:  -MRI spine suggestive of L4-L5 disc protrusion, bilateral foraminotomy stenosis. T8-T9, T9-T10 septic arthritis leading to epidural abscess from T9-T11.  -Patient on IV vancomycin and IV cefepime, awaiting wound cultures results. Infectious diseases on board. -IV morphine and IV fentanyl for pain control.  -Neurosurgery following, plan for abscess drainage and surgical decompression tomorrow a.m. Patient n.p.o. starting midnight. -IV fluids running at 100 ml per hour 0.9 normal saline. Type 2 diabetes mellitus, non-insulin-dependent:  -Patient on glipizide and Metformin home meds. We will start patient on 15 units Lantus for tight glycemic control. HbA1c 12.5. Minimal insulin sliding scale. Hypoglycemia protocol ordered.  -Consult placed for diabetes education. Follow-up on LISA 65 autoantibody. DVT ppx: Lovenox 40 units daily. Will hold for tomorrow. GI ppx: Protonix 40 mg daily    PT/OT/SW: ordered  Discharge Planning: Ongoing    Opal Montgomery MD  Internal Medicine Resident, PGY-1  [address], [place]  12/27/2020, 5:21 PM

## 2020-12-28 ENCOUNTER — ANESTHESIA (OUTPATIENT)
Dept: OPERATING ROOM | Age: 35
DRG: 710 | End: 2020-12-28
Payer: MEDICARE

## 2020-12-28 ENCOUNTER — APPOINTMENT (OUTPATIENT)
Dept: GENERAL RADIOLOGY | Age: 35
DRG: 710 | End: 2020-12-28
Payer: MEDICARE

## 2020-12-28 ENCOUNTER — ANESTHESIA EVENT (OUTPATIENT)
Dept: OPERATING ROOM | Age: 35
DRG: 710 | End: 2020-12-28
Payer: MEDICARE

## 2020-12-28 VITALS — DIASTOLIC BLOOD PRESSURE: 88 MMHG | TEMPERATURE: 98.8 F | SYSTOLIC BLOOD PRESSURE: 134 MMHG | OXYGEN SATURATION: 100 %

## 2020-12-28 LAB
ABSOLUTE EOS #: 0.16 K/UL (ref 0–0.4)
ABSOLUTE IMMATURE GRANULOCYTE: 0 K/UL (ref 0–0.3)
ABSOLUTE LYMPH #: 1.14 K/UL (ref 1–4.8)
ABSOLUTE MONO #: 1.96 K/UL (ref 0.1–0.8)
ANION GAP SERPL CALCULATED.3IONS-SCNC: 16 MMOL/L (ref 9–17)
BASOPHILS # BLD: 0 % (ref 0–2)
BASOPHILS ABSOLUTE: 0 K/UL (ref 0–0.2)
BUN BLDV-MCNC: 15 MG/DL (ref 6–20)
BUN/CREAT BLD: ABNORMAL (ref 9–20)
CALCIUM SERPL-MCNC: 8.6 MG/DL (ref 8.6–10.4)
CHLORIDE BLD-SCNC: 95 MMOL/L (ref 98–107)
CHP ED QC CHECK: YES
CO2: 22 MMOL/L (ref 20–31)
CREAT SERPL-MCNC: 0.61 MG/DL (ref 0.7–1.2)
CULTURE: NO GROWTH
DIFFERENTIAL TYPE: ABNORMAL
EOSINOPHILS RELATIVE PERCENT: 1 % (ref 1–4)
GFR AFRICAN AMERICAN: >60 ML/MIN
GFR NON-AFRICAN AMERICAN: >60 ML/MIN
GFR SERPL CREATININE-BSD FRML MDRD: ABNORMAL ML/MIN/{1.73_M2}
GFR SERPL CREATININE-BSD FRML MDRD: ABNORMAL ML/MIN/{1.73_M2}
GLUCOSE BLD-MCNC: 253 MG/DL (ref 70–99)
GLUCOSE BLD-MCNC: 254 MG/DL (ref 75–110)
GLUCOSE BLD-MCNC: 257 MG/DL
GLUCOSE BLD-MCNC: 263 MG/DL (ref 75–110)
GLUCOSE BLD-MCNC: 269 MG/DL (ref 75–110)
GLUCOSE BLD-MCNC: 276 MG/DL (ref 75–110)
GLUCOSE BLD-MCNC: 314 MG/DL (ref 75–110)
HCT VFR BLD CALC: 39.1 % (ref 40.7–50.3)
HEMOGLOBIN: 13.2 G/DL (ref 13–17)
IMMATURE GRANULOCYTES: 0 %
LACTIC ACID, SEPSIS WHOLE BLOOD: 1.7 MMOL/L (ref 0.5–1.9)
LACTIC ACID, SEPSIS: NORMAL MMOL/L (ref 0.5–1.9)
LYMPHOCYTES # BLD: 7 % (ref 24–44)
Lab: NORMAL
MCH RBC QN AUTO: 29.9 PG (ref 25.2–33.5)
MCHC RBC AUTO-ENTMCNC: 33.8 G/DL (ref 28.4–34.8)
MCV RBC AUTO: 88.5 FL (ref 82.6–102.9)
MONOCYTES # BLD: 12 % (ref 1–7)
MORPHOLOGY: NORMAL
NRBC AUTOMATED: 0 PER 100 WBC
PDW BLD-RTO: 11.8 % (ref 11.8–14.4)
PLATELET # BLD: 329 K/UL (ref 138–453)
PLATELET ESTIMATE: ABNORMAL
PMV BLD AUTO: 9.7 FL (ref 8.1–13.5)
POTASSIUM SERPL-SCNC: 3.6 MMOL/L (ref 3.7–5.3)
RBC # BLD: 4.42 M/UL (ref 4.21–5.77)
RBC # BLD: ABNORMAL 10*6/UL
SEG NEUTROPHILS: 80 % (ref 36–66)
SEGMENTED NEUTROPHILS ABSOLUTE COUNT: 13.04 K/UL (ref 1.8–7.7)
SODIUM BLD-SCNC: 133 MMOL/L (ref 135–144)
SPECIMEN DESCRIPTION: NORMAL
WBC # BLD: 16.3 K/UL (ref 3.5–11.3)
WBC # BLD: ABNORMAL 10*3/UL

## 2020-12-28 PROCEDURE — 3600000014 HC SURGERY LEVEL 4 ADDTL 15MIN: Performed by: NEUROLOGICAL SURGERY

## 2020-12-28 PROCEDURE — 6370000000 HC RX 637 (ALT 250 FOR IP): Performed by: NEUROLOGICAL SURGERY

## 2020-12-28 PROCEDURE — 36415 COLL VENOUS BLD VENIPUNCTURE: CPT

## 2020-12-28 PROCEDURE — 1200000000 HC SEMI PRIVATE

## 2020-12-28 PROCEDURE — 6360000002 HC RX W HCPCS: Performed by: NEUROLOGICAL SURGERY

## 2020-12-28 PROCEDURE — 87186 SC STD MICRODIL/AGAR DIL: CPT

## 2020-12-28 PROCEDURE — 2580000003 HC RX 258: Performed by: NEUROLOGICAL SURGERY

## 2020-12-28 PROCEDURE — G0108 DIAB MANAGE TRN  PER INDIV: HCPCS

## 2020-12-28 PROCEDURE — 3700000000 HC ANESTHESIA ATTENDED CARE: Performed by: NEUROLOGICAL SURGERY

## 2020-12-28 PROCEDURE — 7100000000 HC PACU RECOVERY - FIRST 15 MIN: Performed by: NEUROLOGICAL SURGERY

## 2020-12-28 PROCEDURE — 3700000001 HC ADD 15 MINUTES (ANESTHESIA): Performed by: NEUROLOGICAL SURGERY

## 2020-12-28 PROCEDURE — 2709999900 HC NON-CHARGEABLE SUPPLY: Performed by: NEUROLOGICAL SURGERY

## 2020-12-28 PROCEDURE — 3600000004 HC SURGERY LEVEL 4 BASE: Performed by: NEUROLOGICAL SURGERY

## 2020-12-28 PROCEDURE — 2580000003 HC RX 258: Performed by: STUDENT IN AN ORGANIZED HEALTH CARE EDUCATION/TRAINING PROGRAM

## 2020-12-28 PROCEDURE — 80048 BASIC METABOLIC PNL TOTAL CA: CPT

## 2020-12-28 PROCEDURE — 6370000000 HC RX 637 (ALT 250 FOR IP): Performed by: STUDENT IN AN ORGANIZED HEALTH CARE EDUCATION/TRAINING PROGRAM

## 2020-12-28 PROCEDURE — 7100000001 HC PACU RECOVERY - ADDTL 15 MIN: Performed by: NEUROLOGICAL SURGERY

## 2020-12-28 PROCEDURE — 2780000010 HC IMPLANT OTHER: Performed by: NEUROLOGICAL SURGERY

## 2020-12-28 PROCEDURE — 87070 CULTURE OTHR SPECIMN AEROBIC: CPT

## 2020-12-28 PROCEDURE — 99232 SBSQ HOSP IP/OBS MODERATE 35: CPT | Performed by: INTERNAL MEDICINE

## 2020-12-28 PROCEDURE — 87086 URINE CULTURE/COLONY COUNT: CPT

## 2020-12-28 PROCEDURE — 009U0ZZ DRAINAGE OF SPINAL CANAL, OPEN APPROACH: ICD-10-PCS | Performed by: NEUROLOGICAL SURGERY

## 2020-12-28 PROCEDURE — 2500000003 HC RX 250 WO HCPCS: Performed by: NEUROLOGICAL SURGERY

## 2020-12-28 PROCEDURE — 2720000010 HC SURG SUPPLY STERILE: Performed by: NEUROLOGICAL SURGERY

## 2020-12-28 PROCEDURE — 87205 SMEAR GRAM STAIN: CPT

## 2020-12-28 PROCEDURE — 3209999900 FLUORO FOR SURGICAL PROCEDURES

## 2020-12-28 PROCEDURE — 82947 ASSAY GLUCOSE BLOOD QUANT: CPT

## 2020-12-28 PROCEDURE — 6360000002 HC RX W HCPCS: Performed by: STUDENT IN AN ORGANIZED HEALTH CARE EDUCATION/TRAINING PROGRAM

## 2020-12-28 PROCEDURE — 6360000002 HC RX W HCPCS: Performed by: INTERNAL MEDICINE

## 2020-12-28 PROCEDURE — 2500000003 HC RX 250 WO HCPCS: Performed by: NURSE ANESTHETIST, CERTIFIED REGISTERED

## 2020-12-28 PROCEDURE — 85025 COMPLETE CBC W/AUTO DIFF WBC: CPT

## 2020-12-28 PROCEDURE — 86403 PARTICLE AGGLUT ANTBDY SCRN: CPT

## 2020-12-28 PROCEDURE — 6370000000 HC RX 637 (ALT 250 FOR IP): Performed by: INTERNAL MEDICINE

## 2020-12-28 PROCEDURE — 6360000002 HC RX W HCPCS: Performed by: NURSE ANESTHETIST, CERTIFIED REGISTERED

## 2020-12-28 PROCEDURE — 2580000003 HC RX 258: Performed by: INTERNAL MEDICINE

## 2020-12-28 PROCEDURE — 83605 ASSAY OF LACTIC ACID: CPT

## 2020-12-28 PROCEDURE — 87075 CULTR BACTERIA EXCEPT BLOOD: CPT

## 2020-12-28 PROCEDURE — 00NX0ZZ RELEASE THORACIC SPINAL CORD, OPEN APPROACH: ICD-10-PCS | Performed by: NEUROLOGICAL SURGERY

## 2020-12-28 PROCEDURE — 6360000002 HC RX W HCPCS: Performed by: ANESTHESIOLOGY

## 2020-12-28 RX ORDER — INSULIN GLARGINE 100 [IU]/ML
10 INJECTION, SOLUTION SUBCUTANEOUS ONCE
Status: COMPLETED | OUTPATIENT
Start: 2020-12-28 | End: 2020-12-28

## 2020-12-28 RX ORDER — FENTANYL CITRATE 50 UG/ML
25 INJECTION, SOLUTION INTRAMUSCULAR; INTRAVENOUS EVERY 5 MIN PRN
Status: DISCONTINUED | OUTPATIENT
Start: 2020-12-28 | End: 2020-12-28

## 2020-12-28 RX ORDER — FENTANYL CITRATE 50 UG/ML
50 INJECTION, SOLUTION INTRAMUSCULAR; INTRAVENOUS EVERY 5 MIN PRN
Status: DISCONTINUED | OUTPATIENT
Start: 2020-12-28 | End: 2020-12-28

## 2020-12-28 RX ORDER — MORPHINE SULFATE 10 MG/ML
INJECTION, SOLUTION INTRAMUSCULAR; INTRAVENOUS PRN
Status: DISCONTINUED | OUTPATIENT
Start: 2020-12-28 | End: 2020-12-28 | Stop reason: SDUPTHER

## 2020-12-28 RX ORDER — INSULIN GLARGINE 100 [IU]/ML
12 INJECTION, SOLUTION SUBCUTANEOUS 2 TIMES DAILY
COMMUNITY

## 2020-12-28 RX ORDER — PROPOFOL 10 MG/ML
INJECTION, EMULSION INTRAVENOUS PRN
Status: DISCONTINUED | OUTPATIENT
Start: 2020-12-28 | End: 2020-12-28 | Stop reason: SDUPTHER

## 2020-12-28 RX ORDER — GLIPIZIDE 5 MG/1
5 TABLET ORAL
COMMUNITY

## 2020-12-28 RX ORDER — MAGNESIUM HYDROXIDE 1200 MG/15ML
LIQUID ORAL CONTINUOUS PRN
Status: COMPLETED | OUTPATIENT
Start: 2020-12-28 | End: 2020-12-28

## 2020-12-28 RX ORDER — ESMOLOL HYDROCHLORIDE 10 MG/ML
INJECTION INTRAVENOUS PRN
Status: DISCONTINUED | OUTPATIENT
Start: 2020-12-28 | End: 2020-12-28 | Stop reason: SDUPTHER

## 2020-12-28 RX ORDER — GLIPIZIDE 5 MG/1
5 TABLET ORAL
Status: DISCONTINUED | OUTPATIENT
Start: 2020-12-29 | End: 2020-12-29

## 2020-12-28 RX ORDER — GINSENG 100 MG
CAPSULE ORAL PRN
Status: DISCONTINUED | OUTPATIENT
Start: 2020-12-28 | End: 2020-12-28 | Stop reason: ALTCHOICE

## 2020-12-28 RX ORDER — LIDOCAINE HYDROCHLORIDE AND EPINEPHRINE 10; 10 MG/ML; UG/ML
INJECTION, SOLUTION INFILTRATION; PERINEURAL PRN
Status: DISCONTINUED | OUTPATIENT
Start: 2020-12-28 | End: 2020-12-28 | Stop reason: ALTCHOICE

## 2020-12-28 RX ORDER — ROCURONIUM BROMIDE 10 MG/ML
INJECTION, SOLUTION INTRAVENOUS PRN
Status: DISCONTINUED | OUTPATIENT
Start: 2020-12-28 | End: 2020-12-28 | Stop reason: SDUPTHER

## 2020-12-28 RX ORDER — SODIUM CHLORIDE 0.9 % (FLUSH) 0.9 %
10 SYRINGE (ML) INJECTION PRN
Status: DISCONTINUED | OUTPATIENT
Start: 2020-12-28 | End: 2020-12-31 | Stop reason: HOSPADM

## 2020-12-28 RX ORDER — ONDANSETRON 2 MG/ML
INJECTION INTRAMUSCULAR; INTRAVENOUS PRN
Status: DISCONTINUED | OUTPATIENT
Start: 2020-12-28 | End: 2020-12-28 | Stop reason: SDUPTHER

## 2020-12-28 RX ORDER — VANCOMYCIN HYDROCHLORIDE 1 G/20ML
INJECTION, POWDER, LYOPHILIZED, FOR SOLUTION INTRAVENOUS PRN
Status: DISCONTINUED | OUTPATIENT
Start: 2020-12-28 | End: 2020-12-28 | Stop reason: ALTCHOICE

## 2020-12-28 RX ORDER — MIDAZOLAM HYDROCHLORIDE 1 MG/ML
INJECTION INTRAMUSCULAR; INTRAVENOUS PRN
Status: DISCONTINUED | OUTPATIENT
Start: 2020-12-28 | End: 2020-12-28 | Stop reason: SDUPTHER

## 2020-12-28 RX ORDER — INSULIN GLARGINE 100 [IU]/ML
12 INJECTION, SOLUTION SUBCUTANEOUS 2 TIMES DAILY
Status: DISCONTINUED | OUTPATIENT
Start: 2020-12-28 | End: 2020-12-29

## 2020-12-28 RX ORDER — SUCCINYLCHOLINE/SOD CL,ISO/PF 100 MG/5ML
SYRINGE (ML) INTRAVENOUS PRN
Status: DISCONTINUED | OUTPATIENT
Start: 2020-12-28 | End: 2020-12-28 | Stop reason: SDUPTHER

## 2020-12-28 RX ORDER — DIPHENHYDRAMINE HYDROCHLORIDE 50 MG/ML
INJECTION INTRAMUSCULAR; INTRAVENOUS PRN
Status: DISCONTINUED | OUTPATIENT
Start: 2020-12-28 | End: 2020-12-28 | Stop reason: SDUPTHER

## 2020-12-28 RX ORDER — SODIUM CHLORIDE, SODIUM LACTATE, POTASSIUM CHLORIDE, CALCIUM CHLORIDE 600; 310; 30; 20 MG/100ML; MG/100ML; MG/100ML; MG/100ML
INJECTION, SOLUTION INTRAVENOUS CONTINUOUS
Status: DISCONTINUED | OUTPATIENT
Start: 2020-12-28 | End: 2020-12-28

## 2020-12-28 RX ORDER — DEXAMETHASONE SODIUM PHOSPHATE 4 MG/ML
INJECTION, SOLUTION INTRA-ARTICULAR; INTRALESIONAL; INTRAMUSCULAR; INTRAVENOUS; SOFT TISSUE PRN
Status: DISCONTINUED | OUTPATIENT
Start: 2020-12-28 | End: 2020-12-28 | Stop reason: SDUPTHER

## 2020-12-28 RX ORDER — SODIUM CHLORIDE, SODIUM LACTATE, POTASSIUM CHLORIDE, CALCIUM CHLORIDE 600; 310; 30; 20 MG/100ML; MG/100ML; MG/100ML; MG/100ML
INJECTION, SOLUTION INTRAVENOUS CONTINUOUS
Status: DISCONTINUED | OUTPATIENT
Start: 2020-12-28 | End: 2020-12-29

## 2020-12-28 RX ORDER — FENTANYL CITRATE 50 UG/ML
INJECTION, SOLUTION INTRAMUSCULAR; INTRAVENOUS PRN
Status: DISCONTINUED | OUTPATIENT
Start: 2020-12-28 | End: 2020-12-28 | Stop reason: SDUPTHER

## 2020-12-28 RX ORDER — POTASSIUM CHLORIDE 20 MEQ/1
40 TABLET, EXTENDED RELEASE ORAL ONCE
Status: COMPLETED | OUTPATIENT
Start: 2020-12-28 | End: 2020-12-28

## 2020-12-28 RX ORDER — LIDOCAINE HYDROCHLORIDE 10 MG/ML
INJECTION, SOLUTION EPIDURAL; INFILTRATION; INTRACAUDAL; PERINEURAL PRN
Status: DISCONTINUED | OUTPATIENT
Start: 2020-12-28 | End: 2020-12-28 | Stop reason: SDUPTHER

## 2020-12-28 RX ORDER — SODIUM CHLORIDE 0.9 % (FLUSH) 0.9 %
10 SYRINGE (ML) INJECTION EVERY 12 HOURS SCHEDULED
Status: DISCONTINUED | OUTPATIENT
Start: 2020-12-28 | End: 2020-12-31 | Stop reason: HOSPADM

## 2020-12-28 RX ADMIN — SUGAMMADEX 307 MG: 100 INJECTION, SOLUTION INTRAVENOUS at 19:00

## 2020-12-28 RX ADMIN — Medication 100 MG: at 17:37

## 2020-12-28 RX ADMIN — FENTANYL CITRATE 50 MCG: 50 INJECTION, SOLUTION INTRAMUSCULAR; INTRAVENOUS at 18:18

## 2020-12-28 RX ADMIN — CEFEPIME HYDROCHLORIDE 2 G: 2 INJECTION, POWDER, FOR SOLUTION INTRAVENOUS at 12:52

## 2020-12-28 RX ADMIN — MORPHINE SULFATE 5 MG: 10 INJECTION, SOLUTION INTRAMUSCULAR; INTRAVENOUS at 19:12

## 2020-12-28 RX ADMIN — FENTANYL CITRATE 50 MCG: 50 INJECTION, SOLUTION INTRAMUSCULAR; INTRAVENOUS at 20:02

## 2020-12-28 RX ADMIN — Medication 12.5 MG: at 17:48

## 2020-12-28 RX ADMIN — DEXAMETHASONE SODIUM PHOSPHATE 4 MG: 4 INJECTION, SOLUTION INTRAMUSCULAR; INTRAVENOUS at 18:18

## 2020-12-28 RX ADMIN — INSULIN GLARGINE 10 UNITS: 100 INJECTION, SOLUTION SUBCUTANEOUS at 12:50

## 2020-12-28 RX ADMIN — FENTANYL CITRATE 50 MCG: 50 INJECTION, SOLUTION INTRAMUSCULAR; INTRAVENOUS at 18:30

## 2020-12-28 RX ADMIN — FENTANYL CITRATE 50 MCG: 50 INJECTION, SOLUTION INTRAMUSCULAR; INTRAVENOUS at 19:49

## 2020-12-28 RX ADMIN — SODIUM CHLORIDE, POTASSIUM CHLORIDE, SODIUM LACTATE AND CALCIUM CHLORIDE: 600; 310; 30; 20 INJECTION, SOLUTION INTRAVENOUS at 07:30

## 2020-12-28 RX ADMIN — INSULIN GLARGINE 12 UNITS: 100 INJECTION, SOLUTION SUBCUTANEOUS at 21:39

## 2020-12-28 RX ADMIN — MIDAZOLAM HYDROCHLORIDE 2 MG: 1 INJECTION, SOLUTION INTRAMUSCULAR; INTRAVENOUS at 17:36

## 2020-12-28 RX ADMIN — INSULIN LISPRO 6 UNITS: 100 INJECTION, SOLUTION INTRAVENOUS; SUBCUTANEOUS at 12:00

## 2020-12-28 RX ADMIN — CEFEPIME HYDROCHLORIDE 2 G: 2 INJECTION, POWDER, FOR SOLUTION INTRAVENOUS at 00:57

## 2020-12-28 RX ADMIN — INSULIN LISPRO 2 UNITS: 100 INJECTION, SOLUTION INTRAVENOUS; SUBCUTANEOUS at 21:40

## 2020-12-28 RX ADMIN — POTASSIUM CHLORIDE 40 MEQ: 1500 TABLET, EXTENDED RELEASE ORAL at 08:03

## 2020-12-28 RX ADMIN — ESMOLOL HYDROCHLORIDE 10 MG: 10 INJECTION, SOLUTION INTRAVENOUS at 17:50

## 2020-12-28 RX ADMIN — ONDANSETRON 4 MG: 2 INJECTION INTRAMUSCULAR; INTRAVENOUS at 19:00

## 2020-12-28 RX ADMIN — SODIUM CHLORIDE, POTASSIUM CHLORIDE, SODIUM LACTATE AND CALCIUM CHLORIDE: 600; 310; 30; 20 INJECTION, SOLUTION INTRAVENOUS at 22:07

## 2020-12-28 RX ADMIN — PROPOFOL 200 MG: 10 INJECTION, EMULSION INTRAVENOUS at 17:37

## 2020-12-28 RX ADMIN — PANTOPRAZOLE SODIUM 40 MG: 40 TABLET, DELAYED RELEASE ORAL at 08:03

## 2020-12-28 RX ADMIN — LIDOCAINE HYDROCHLORIDE 50 MG: 10 INJECTION, SOLUTION EPIDURAL; INFILTRATION; INTRACAUDAL; PERINEURAL at 17:37

## 2020-12-28 RX ADMIN — MORPHINE SULFATE 5 MG: 10 INJECTION, SOLUTION INTRAMUSCULAR; INTRAVENOUS at 19:24

## 2020-12-28 RX ADMIN — FENTANYL CITRATE 25 MCG: 50 INJECTION, SOLUTION INTRAMUSCULAR; INTRAVENOUS at 21:41

## 2020-12-28 RX ADMIN — ESMOLOL HYDROCHLORIDE 30 MG: 10 INJECTION, SOLUTION INTRAVENOUS at 19:08

## 2020-12-28 RX ADMIN — FENTANYL CITRATE 25 MCG: 50 INJECTION, SOLUTION INTRAMUSCULAR; INTRAVENOUS at 12:52

## 2020-12-28 RX ADMIN — Medication 1250 MG: at 06:36

## 2020-12-28 RX ADMIN — FENTANYL CITRATE 50 MCG: 50 INJECTION, SOLUTION INTRAMUSCULAR; INTRAVENOUS at 17:47

## 2020-12-28 RX ADMIN — FENTANYL CITRATE 25 MCG: 50 INJECTION, SOLUTION INTRAMUSCULAR; INTRAVENOUS at 04:29

## 2020-12-28 RX ADMIN — ROCURONIUM BROMIDE 30 MG: 10 INJECTION, SOLUTION INTRAVENOUS at 17:41

## 2020-12-28 RX ADMIN — Medication 1250 MG: at 21:38

## 2020-12-28 RX ADMIN — FENTANYL CITRATE 100 MCG: 50 INJECTION, SOLUTION INTRAMUSCULAR; INTRAVENOUS at 17:37

## 2020-12-28 RX ADMIN — MORPHINE SULFATE 2 MG: 4 INJECTION, SOLUTION INTRAMUSCULAR; INTRAVENOUS at 10:50

## 2020-12-28 RX ADMIN — ROCURONIUM BROMIDE 20 MG: 10 INJECTION, SOLUTION INTRAVENOUS at 18:18

## 2020-12-28 RX ADMIN — INSULIN LISPRO 6 UNITS: 100 INJECTION, SOLUTION INTRAVENOUS; SUBCUTANEOUS at 08:02

## 2020-12-28 RX ADMIN — ESMOLOL HYDROCHLORIDE 10 MG: 10 INJECTION, SOLUTION INTRAVENOUS at 18:50

## 2020-12-28 ASSESSMENT — PULMONARY FUNCTION TESTS
PIF_VALUE: 3
PIF_VALUE: 17
PIF_VALUE: 18
PIF_VALUE: 18
PIF_VALUE: 16
PIF_VALUE: 18
PIF_VALUE: 18
PIF_VALUE: 19
PIF_VALUE: 18
PIF_VALUE: 17
PIF_VALUE: 18
PIF_VALUE: 19
PIF_VALUE: 6
PIF_VALUE: 16
PIF_VALUE: 17
PIF_VALUE: 4
PIF_VALUE: 16
PIF_VALUE: 18
PIF_VALUE: 19
PIF_VALUE: 15
PIF_VALUE: 18
PIF_VALUE: 18
PIF_VALUE: 16
PIF_VALUE: 18
PIF_VALUE: 15
PIF_VALUE: 15
PIF_VALUE: 19
PIF_VALUE: 18
PIF_VALUE: 19
PIF_VALUE: 17
PIF_VALUE: 18
PIF_VALUE: 16
PIF_VALUE: 17
PIF_VALUE: 18
PIF_VALUE: 19
PIF_VALUE: 16
PIF_VALUE: 19
PIF_VALUE: 18
PIF_VALUE: 19
PIF_VALUE: 1
PIF_VALUE: 18
PIF_VALUE: 15
PIF_VALUE: 16
PIF_VALUE: 20
PIF_VALUE: 18
PIF_VALUE: 16
PIF_VALUE: 18
PIF_VALUE: 16
PIF_VALUE: 17
PIF_VALUE: 16
PIF_VALUE: 16
PIF_VALUE: 19
PIF_VALUE: 19
PIF_VALUE: 16
PIF_VALUE: 15
PIF_VALUE: 2
PIF_VALUE: 16
PIF_VALUE: 15
PIF_VALUE: 19
PIF_VALUE: 16
PIF_VALUE: 15
PIF_VALUE: 15
PIF_VALUE: 18
PIF_VALUE: 16
PIF_VALUE: 15
PIF_VALUE: 6
PIF_VALUE: 18
PIF_VALUE: 18
PIF_VALUE: 15
PIF_VALUE: 18
PIF_VALUE: 15
PIF_VALUE: 1
PIF_VALUE: 19
PIF_VALUE: 1
PIF_VALUE: 18
PIF_VALUE: 16
PIF_VALUE: 18
PIF_VALUE: 19
PIF_VALUE: 15
PIF_VALUE: 16
PIF_VALUE: 16
PIF_VALUE: 18
PIF_VALUE: 1
PIF_VALUE: 18
PIF_VALUE: 19
PIF_VALUE: 18
PIF_VALUE: 16
PIF_VALUE: 1
PIF_VALUE: 16
PIF_VALUE: 2
PIF_VALUE: 19
PIF_VALUE: 18
PIF_VALUE: 19
PIF_VALUE: 16
PIF_VALUE: 16
PIF_VALUE: 18
PIF_VALUE: 16

## 2020-12-28 ASSESSMENT — PAIN DESCRIPTION - DESCRIPTORS
DESCRIPTORS: ACHING
DESCRIPTORS: ACHING

## 2020-12-28 ASSESSMENT — PAIN DESCRIPTION - PAIN TYPE
TYPE: CHRONIC PAIN;SURGICAL PAIN
TYPE: CHRONIC PAIN;SURGICAL PAIN

## 2020-12-28 ASSESSMENT — ENCOUNTER SYMPTOMS
BACK PAIN: 1
CONSTIPATION: 0
SHORTNESS OF BREATH: 0
NAUSEA: 0
DIARRHEA: 0
VOMITING: 0
TROUBLE SWALLOWING: 0
SORE THROAT: 0
ABDOMINAL PAIN: 0

## 2020-12-28 ASSESSMENT — LIFESTYLE VARIABLES: SMOKING_STATUS: 1

## 2020-12-28 ASSESSMENT — PAIN DESCRIPTION - FREQUENCY
FREQUENCY: CONTINUOUS
FREQUENCY: CONTINUOUS

## 2020-12-28 ASSESSMENT — PAIN DESCRIPTION - LOCATION: LOCATION: BACK

## 2020-12-28 ASSESSMENT — PAIN SCALES - GENERAL
PAINLEVEL_OUTOF10: 7
PAINLEVEL_OUTOF10: 7
PAINLEVEL_OUTOF10: 10
PAINLEVEL_OUTOF10: 0

## 2020-12-28 NOTE — PROGRESS NOTES
[facility]  Internal Medicine Teaching Residency Program  Inpatient Daily Progress Note  ______________________________________________________________________________    Patient: Milla Cordero  YOB: 1985   XPW:[id number]    Acct: [de-identified]     Room: Critical access hospital  Admit date: 12/27/2020  Today's date: 12/28/20  Number of days in the hospital: 1    SUBJECTIVE   Admitting Diagnosis: Epidural abscess  CC: Lower back pain    Pt examined at bedside. Chart & results reviewed. Vitals stable. Patient hypertensive /88 this a.m. Patient was kept n.p.o. for neurosurgery this a.m., surgery scheduled for tomorrow morning will be kept n.p.o. starting tonight midnight. Continued on IV cefepime, IV vancomycin. ID following. ROS:  Constitutional:  negative for chills, fevers, sweats  Respiratory:  negative for cough, dyspnea on exertion, hemoptysis, shortness of breath, wheezing  Cardiovascular:  negative for chest pain, chest pressure/discomfort, lower extremity edema, palpitations  Gastrointestinal:  negative for abdominal pain, constipation, diarrhea, nausea, vomiting  Neurological:  positive for low back pain   BRIEF HISTORY     The patient is a pleasant 38 y.o. male PMH significant for diabetes mellitus type 2, IV drug use presents with a chief complaint of lower back pain for past several days.     According to the patient, he started having lower back pain 3 to 4 days back, but not associated with any fevers, chills, numbness, tingling, weakness of bilateral lower legs. He initially went to [facility] ED, was transferred to [facility] upon MRI with contrast finding of T10-T12 spinal epidural fluid collection. He denied any saddle anesthesia, urinary or fecal incontinence.   Patient states that he last used IV drugs in 2014.     In the ED, patient was hypertensive /87, HR 68, saturating 99% on room air. Labs were unremarkable except for hyperglycemia blood glucose 301, elevated WBC count 16.6, elevated procalcitonin 0.28, elevated CRP 85.4. Hemoglobin A1c 12.5. ESR 64.     Wound culture ordered. Direct exam showed many gram-positive cocci in clusters. On physical exam, patient has strength 5/5 in all 4 extremities, sensation intact, cranial nerves grossly intact. OBJECTIVE     Vital Signs:  BP (!) 150/88   Pulse 73   Temp 98.1 °F (36.7 °C) (Oral)   Resp 20   Ht 5' 8\" (1.727 m)   Wt 169 lb (76.7 kg)   SpO2 95%   BMI 25.70 kg/m²     No data recorded. No intake/output data recorded. Physical Exam:  Constitutional: This is a well developed, well nourished, 25-29.9 - Overweight 38y.o. year old male who is alert, oriented, cooperative and in no apparent distress. Head:normocephalic and atraumatic. EENT:  PERRLA. No conjunctival injections. Septum was midline, mucosa was without erythema, exudates or cobblestoning. No thrush was noted. Neck: Supple without thyromegaly. No elevated JVP. Trachea was midline. Respiratory: Chest was symmetrical without dullness to percussion. Breath sounds bilaterally were clear to auscultation. There were no wheezes, rhonchi or rales. There is no intercostal retraction or use of accessory muscles. No egophony noted. Cardiovascular: Regular without murmur, clicks, gallops or rubs. Abdomen: Slightly rounded and soft without organomegaly. No rebound, rigidity or guarding was appreciated. Lymphatic: No lymphadenopathy. Musculoskeletal: Normal curvature of the spine. No gross muscle weakness. Extremities:  No lower extremity edema, ulcerations, tenderness, varicosities or erythema. Muscle size, tone and strength are normal.  No involuntary movements are noted. Skin:  Warm and dry. Good color, turgor and pigmentation. No lesions or scars.   No cyanosis or clubbing Neurological/Psychiatric: The patient's general behavior, level of consciousness, thought content and emotional status is normal.        Medications:  Scheduled Medications:    sodium chloride flush  10 mL Intravenous 2 times per day    [Held by provider] enoxaparin  40 mg Subcutaneous Daily    sodium chloride  20 mL Intravenous Once    cefepime  2 g Intravenous Q12H    insulin lispro  0-12 Units Subcutaneous TID WC    insulin lispro  0-6 Units Subcutaneous Nightly    vancomycin (VANCOCIN) intermittent dosing (placeholder)   Other RX Placeholder    insulin glargine  15 Units Subcutaneous Nightly    vancomycin  1,250 mg Intravenous Q12H    pantoprazole  40 mg Oral QAM AC     Continuous Infusions:    lactated ringers 100 mL/hr at 12/28/20 0730    dextrose       PRN Medications    sodium chloride flush, 10 mL, PRN      potassium chloride, 40 mEq, PRN    Or      potassium alternative oral replacement, 40 mEq, PRN    Or      potassium chloride, 10 mEq, PRN      magnesium sulfate, 1 g, PRN      promethazine, 12.5 mg, Q6H PRN    Or      ondansetron, 4 mg, Q6H PRN      polyethylene glycol, 17 g, Daily PRN      nicotine, 1 patch, Daily PRN      acetaminophen, 650 mg, Q6H PRN    Or      acetaminophen, 650 mg, Q6H PRN      fentanNYL, 25 mcg, Q2H PRN      morphine, 2 mg, Q4H PRN      melatonin, 3 mg, Nightly PRN      glucose, 15 g, PRN      dextrose, 12.5 g, PRN      glucagon (rDNA), 1 mg, PRN      dextrose, 100 mL/hr, PRN        Diagnostic Labs:  CBC:   Recent Labs     12/27/20  0812 12/28/20  0621   WBC 16.6* 16.3*   RBC 4.58 4.42   HGB 13.4 13.2   HCT 40.2* 39.1*   MCV 87.8 88.5   RDW 11.9 11.8    329     BMP:   Recent Labs     12/27/20  0812 12/28/20  0621   * 133*   K 4.0 3.6*   CL 94* 95*   CO2 22 22   BUN 14 15   CREATININE 0.57* 0.61*     BNP: No results for input(s): BNP in the last 72 hours.   PT/INR:   Recent Labs     12/27/20  0812   PROTIME 9.9   INR 0.9     APTT: [facility];  [place], [place]  12/28/2020, 11:17 AM

## 2020-12-28 NOTE — ED NOTES
Patient reports his legs feel weak and that it feels like he cannot lift them  Dr Chester Cowart and Dr Sheehan Every.      Crystal Mejia RN  12/28/20 9740

## 2020-12-28 NOTE — PROGRESS NOTES
[facility] - Progress Note    Today's Date and Time: 12/28/2020, 10:14 AM    Impression :   · History of IV drug abuse  · Thoracic epidural abscess with possible cord compression at T10 T12 level  · Back pain  · S aureus septicemia 12-27-20    Recommendations:   · Continue vancomycin and cefepime pending culture data from surgery  · Surgical I&D at the discretion of neurosurgery service    Medical Decision Making/Summary/Discussion:12/28/2020     ·   Infection Control Recommendations   · [place] Precautions  · Contact Isolation MRSA    Antimicrobial Stewardship Recommendations     · Simplification of therapy  · Targeted therapy    Coordination of Outpatient Care:   · Estimated Length of IV antimicrobials: TBD  · Patient will need Midline Catheter Insertion: TBD  · Patient will need PICC line Insertion:TBD  · Patient will need: Home IV , [place],  SNF,  LTAC:TBD  · Patient will need outpatient wound care:TBD    Chief complaint/reason for consultation:   · Epidural abscess of the thoracic area      History of Present Illness:   Shaneka Barrera is a 38y.o.-year-old male who was initially admitted on 12/27/2020. Patient seen at the request of Teresa Fuentes. INITIAL HISTORY:    Patient was transferred from [facility] because of the presence of an epidural abscess. The patient has had past history of IV drug abuse. He had developed back pain over the last few days and presented to [facility]. A CT scan at [facility] show the presence of an epidural abscess with possible cord compression noted at T9 T11 level. Patient was transferred to [facility] for neurosurgical care. The patient denied any fevers, chills, night sweats. He indicated the presence of severe back pain. He denied any loss of function of the arms or legs. He denied any sphincter dysfunction. Patient has been evaluated by the neurosurgical service will plan to do a decompressive procedure on 12/28/2020. The patient had blood cultures obtained and he has been placed on Zosyn and vancomycin pending further data. At [facility]. [facility] he is being continued on vancomycin on place on cefepime. He has a prior history of MRSA. His Covid test was negative on 12/27/2020. CURRENT EVALUATION : 12/28/2020    Patient evaluated and examined in the ER  Afebrile  VS stable    RR 20  On room air  02 sat 95 %    Surgical plans for decompression    Labs, X rays reviewed: 12/28/2020    BUN: 14  Cr:0.57    WBC: 16.6  Hb: 13.4  Plat: 312    Cultures:  Urine:  · 12-27-20 pending  Blood:  · 12-27-20 pending  Sputum :  ·   Wound:  · 12-27-20 : lt flank swab,  s aureus heavy growth    Discussed with patient, RN, NS. I have personally reviewed the past medical history, past surgical history, medications, social history, and family history, and I have updated the database accordingly. Past Medical History:   No past medical history on file. Past Surgical  History:   No past surgical history on file.     Medications:      sodium chloride flush  10 mL Intravenous 2 times per day    [Held by provider] enoxaparin  40 mg Subcutaneous Daily    sodium chloride  20 mL Intravenous Once    cefepime  2 g Intravenous Q12H    insulin lispro  0-12 Units Subcutaneous TID WC    insulin lispro  0-6 Units Subcutaneous Nightly    vancomycin (VANCOCIN) intermittent dosing (placeholder)   Other RX Placeholder    insulin glargine  15 Units Subcutaneous Nightly    vancomycin  1,250 mg Intravenous Q12H    pantoprazole  40 mg Oral QAM AC       Social History:     Social History     Socioeconomic History    Marital status: Unknown     Spouse name: Not on file    Number of children: Not on file    Years of education: Not on file    Highest education level: Not on file   Occupational History    Not on file   Social Needs  Financial resource strain: Not on file    Food insecurity     Worry: Not on file     Inability: Not on file   [place] needs     Medical: Not on file     Non-medical: Not on file   Tobacco Use    Smoking status: Not on file   Substance and Sexual Activity    Alcohol use: Not on file    Drug use: Not on file    Sexual activity: Not on file   Lifestyle    Physical activity     Days per week: Not on file     Minutes per session: Not on file    Stress: Not on file   Relationships    Social connections     Talks on phone: Not on file     Gets together: Not on file     Attends Confucianism service: Not on file     Active member of club or organization: Not on file     Attends meetings of clubs or organizations: Not on file     Relationship status: Not on file    Intimate partner violence     Fear of current or ex partner: Not on file     Emotionally abused: Not on file     Physically abused: Not on file     Forced sexual activity: Not on file   Other Topics Concern    Not on file   Social History Narrative    Not on file       Family History:   No family history on file. Allergies:   Patient has no known allergies. Review of Systems:   Constitutional: No fevers or chills. No systemic complaints  Head: No headaches  Eyes: No double vision or blurry vision. No conjunctival inflammation. ENT: No sore throat or runny nose. . No hearing loss, tinnitus or vertigo. Cardiovascular: No chest pain or palpitations. No shortness of breath. No ALMEIDA  Lung: No shortness of breath or cough. No sputum production  Abdomen: No nausea, vomiting, diarrhea, or abdominal pain. Qian Thayer No cramps. Genitourinary: No increased urinary frequency, or dysuria. No hematuria. No suprapubic or CVA pain  Musculoskeletal: No muscle aches or pains. No joint effusions, swelling or deformities. Back pain in the thoracic area  Hematologic: No bleeding or bruising. Neurologic: No headache, weakness, numbness, or tingling. Integument: No rash, no ulcers. Psychiatric: No depression. Endocrine: No polyuria, no polydipsia, no polyphagia. Physical Examination :     No data found. General Appearance: Awake, alert, and in no apparent distress  Head:  Normocephalic, no trauma  Eyes: Pupils equal, round, reactive to light and accommodation; extraocular movements intact; sclera anicteric; conjunctivae pink. No embolic phenomena. ENT: Oropharynx clear, without erythema, exudate, or thrush. No tenderness of sinuses. Mouth/throat: mucosa pink and moist. No lesions. Dentition in good repair. Neck:Supple, without lymphadenopathy. Thyroid normal, No bruits. Pulmonary/Chest: Clear to auscultation, without wheezes, rales, or rhonchi. No dullness to percussion. Cardiovascular: Regular rate and rhythm without murmurs, rubs, or gallops. Abdomen: Soft, non tender. Bowel sounds normal. No organomegaly  All four Extremities: No cyanosis, clubbing, edema, or effusions. Neurologic: No gross sensory or motor deficits. Skin: Warm and dry with good turgor. No signs of peripheral arterial or venous insufficiency. No ulcerations. No open wounds. Medical Decision Making -Laboratory:   I have independently reviewed/ordered the following labs:    CBC with Differential:   Recent Labs     12/27/20  0812 12/28/20  0621   WBC 16.6* 16.3*   HGB 13.4 13.2   HCT 40.2* 39.1*    329   LYMPHOPCT 9* 7*   MONOPCT 10* 12*     BMP:   Recent Labs     12/27/20  0812 12/28/20  0621   * 133*   K 4.0 3.6*   CL 94* 95*   CO2 22 22   BUN 14 15   CREATININE 0.57* 0.61*     Hepatic Function Panel:   Recent Labs     12/27/20  0812   PROT 6.8   LABALBU 3.0*   BILITOT 0.45   ALKPHOS 120   ALT 25   AST 17     No results for input(s): RPR in the last 72 hours. No results for input(s): HIV in the last 72 hours. No results for input(s): BC in the last 72 hours.   Lab Results   Component Value Date    MUCUS NOT REPORTED 12/27/2020    RBC 4.42 12/28/2020 TRICHOMONAS NOT REPORTED 2020    WBC 16.3 2020    YEAST NOT REPORTED 2020    TURBIDITY CLEAR 2020     Lab Results   Component Value Date    CREATININE 0.61 2020    GLUCOSE 253 2020       Medical Decision Making-Imaging:       Medical Decision Fhixyd-Amkjlvfe-Qbuvq:       Medical Decision Making-Other:     Note:  · Labs, medications, radiologic studies were reviewed with personal review of films  · Large amounts of data were reviewed  · Discussed with nursing Staff, Discharge planner  · Infection Control and Prevention measures reviewed  · All prior entries were reviewed  · Administer medications as ordered  · Prognosis: Fair  · Discharge planning reviewed  · Follow up as outpatient. Thank you for allowing us to participate in the care of this patient. Please call with questions.     Cato Apgar, MD  Pager: [phone] - Office: [phone]

## 2020-12-28 NOTE — ED NOTES
Neurosurgery at bedside, plan for surgery tomorrow. NPO after midnight.      Scott Arthur RN  12/28/20 2853

## 2020-12-28 NOTE — PROGRESS NOTES
Margot Valera,  Seen for evaluation and education on uncontrolled diabetes. Pt states admitted d/t blister on back from heating pad getting infected and forming and abscess. States h/o diabetes, no endocrinologist. Gets DM meds through PCP. Lab Results   Component Value Date    LABA1C 12.5 (H) 12/27/2020     Lab Results   Component Value Date     12/27/2020       Discussed with Margot Valera, their current diabetes self care routines prior to this admission. 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 Provided and reviewed insulin administration and provided BD get started kit. Emphasized need for bs control for wound healing. Briefly discussed role of diet, physical activity, daily use of medications and self monitoring for good diabetes control, provided diabetes education folder. Also referred patient to view diabetes education programs on education TV - Channel 75 and 76 at 9am, 3pm 6pm and 9pm      Discussed need to be aware of sign and symptoms of hypoglycemia and hyperglycemia  and treatment for hypoglycemia and hyperglycemia. When to call PCP for advice / sick day plan. Provided patient with card and contact information for out patient  Diabetes education services and encouraged follow up with a PCP. Would recommend follow -up education at outpatient diabetes education at [place] [id number]. An ordered is needed for this service and can be placed via EHR.  Discharge Navigator --- Med Reconciliation -- New orders for discharge tab -- search diabetic ed - choose REF 20 -- review and sign Patient will need prescriptions for the following supplies at discharge: Recommend Meds to beds pt needs new meter. Writer gave him one but he needs Rx to get supplies on regular basis. Preferred / formulary blood glucose meter, with strips and lancets for 1-4 times per day blood glucose testing  Insulin pen needle tips - 4mm karlos for insulin injections 4 times per day. Pt would benefit from nasal insulin (afreeza)    Patient needs reinforcement. understanding  of survival skills education.           Kathleen Mandujano, ROSEMARY, LD

## 2020-12-28 NOTE — ED NOTES
Pt given pain meds. Pt updated on POC for rest of shift. No needs at this time.      Wade Gray RN  12/28/20 [id number]

## 2020-12-28 NOTE — ED NOTES
Report received from TIMOTHY Nick  Pt resting on cot, RR even and unlabored, NAD, A&O  Pt requesting pain meds.   Pt denies any other needs, call light in reach     Ana Luisa Jones RN  12/27/20 6027

## 2020-12-28 NOTE — PROGRESS NOTES
[facility] - Progress Note  Today's Date and Time: 12/28/2020, 9:12 AM    Impression :   · History of IV drug abuse  · Thoracic epidural abscess with possible cord compression at T10 T12 level  · Back pain    Recommendations:   · Continue vancomycin and cefepime pending culture data from surgery  · Surgical I&D at the discretion of neurosurgery service    Medical Decision Making/Summary/Discussion:12/28/2020     ·   Infection Control Recommendations   · [place] Precautions  · Contact Isolation MRSA    Antimicrobial Stewardship Recommendations     · Simplification of therapy  · Targeted therapy    Coordination of Outpatient Care:   · Estimated Length of IV antimicrobials: TBD  · Patient will need Midline Catheter Insertion: TBD  · Patient will need PICC line Insertion:TBD  · Patient will need: Home IV , [place],  SNF,  LTAC:TBD  · Patient will need outpatient wound care:TBD    Chief complaint/reason for consultation:   · Epidural abscess of the thoracic area      History of Present Illness:   Sabiha Guevara is a 38y.o.-year-old male who was initially admitted on 12/27/2020. Patient seen at the request of [facility]. INITIAL HISTORY:    Patient was transferred from [facility] because of the presence of an epidural abscess. The patient has had past history of IV drug abuse. He had developed back pain over the last few days and presented to [facility]. A CT scan at [facility] show the presence of an epidural abscess with possible cord compression noted at T9 T11 level. Patient was transferred to [facility] for neurosurgical care. The patient denied any fevers, chills, night sweats. He indicated the presence of severe back pain. He denied any loss of function of the arms or legs. He denied any sphincter dysfunction. Patient has been evaluated by the neurosurgical service will plan to do a decompressive procedure on 12/28/2020. The patient had blood cultures obtained and he has been placed on Zosyn and vancomycin pending further data. At [facility] he is being continued on vancomycin on place on cefepime. He has a prior history of MRSA. His Covid test was negative on 12/27/2020. CURRENT EVALUATION 12/28/20   /88, HR 73, afebrile  He states he has back pain rated at 4/10 currently. Denies chest pain, shortness of breath, bowel or bladder incontinence. Neurosurgery is planning decompression surgery tomorrow. Labs, X rays reviewed: 12/28/2020    BUN: 14 -> 15  Cr:0.57 -> 0.61    WBC: 16.6 -> 16.3  Hb: 13.4 -> 13.2  Plat: 312 -> 329    Cultures:  Urine:  · 12-27-20 pending  Blood:  · 12-27-20 pending  · 12-28-20 gram + cocci in clusters  Sputum :  ·   Wound:  · 12-28-20 gram + cocci in clusters    Discussed with patient, RN, NS. I have personally reviewed the past medical history, past surgical history, medications, social history, and family history, and I have updated the database accordingly. Past Medical History:   No past medical history on file. Past Surgical  History:   No past surgical history on file.     Medications:      sodium chloride flush  10 mL Intravenous 2 times per day    [Held by provider] enoxaparin  40 mg Subcutaneous Daily    sodium chloride  20 mL Intravenous Once    cefepime  2 g Intravenous Q12H    insulin lispro  0-12 Units Subcutaneous TID WC    insulin lispro  0-6 Units Subcutaneous Nightly    vancomycin (VANCOCIN) intermittent dosing (placeholder)   Other RX Placeholder    insulin glargine  15 Units Subcutaneous Nightly    vancomycin  1,250 mg Intravenous Q12H    pantoprazole  40 mg Oral QAM AC       Social History:     Social History     Socioeconomic History    Marital status: Unknown     Spouse name: Not on file    Number of children: Not on file    Years of education: Not on file    Highest education level: Not on file   Occupational History    Not on file Social Needs    Financial resource strain: Not on file    Food insecurity     Worry: Not on file     Inability: Not on file    Transportation needs     Medical: Not on file     Non-medical: Not on file   Tobacco Use    Smoking status: Not on file   Substance and Sexual Activity    Alcohol use: Not on file    Drug use: Not on file    Sexual activity: Not on file   Lifestyle    Physical activity     Days per week: Not on file     Minutes per session: Not on file    Stress: Not on file   Relationships    Social connections     Talks on phone: Not on file     Gets together: Not on file     Attends Roman Catholic service: Not on file     Active member of club or organization: Not on file     Attends meetings of clubs or organizations: Not on file     Relationship status: Not on file    Intimate partner violence     Fear of current or ex partner: Not on file     Emotionally abused: Not on file     Physically abused: Not on file     Forced sexual activity: Not on file   Other Topics Concern    Not on file   Social History Narrative    Not on file       Family History:   No family history on file. Allergies:   Patient has no known allergies. Review of Systems:   Constitutional: No fevers or chills. No systemic complaints  Head: No headaches  Eyes: No double vision or blurry vision. No conjunctival inflammation. ENT: No sore throat or runny nose. . No hearing loss, tinnitus or vertigo. Cardiovascular: No chest pain or palpitations. No shortness of breath. No ALMEIDA  Lung: No shortness of breath or cough. No sputum production  Abdomen: No nausea, vomiting, diarrhea, or abdominal pain. Lysbeth Carrel No cramps. Genitourinary: No increased urinary frequency, or dysuria. No hematuria. No suprapubic or CVA pain  Musculoskeletal: No muscle aches or pains. No joint effusions, swelling or deformities. Back pain in the thoracic area  Hematologic: No bleeding or bruising. Neurologic: No headache, weakness, numbness, or tingling. Integument: No rash, no ulcers. Psychiatric: No depression. Endocrine: No polyuria, no polydipsia, no polyphagia. Physical Examination :     No data found. General Appearance: Awake, alert, and in no apparent distress  Head:  Normocephalic, no trauma  Eyes: Pupils equal, round, reactive to light and accommodation; extraocular movements intact; sclera anicteric; conjunctivae pink. No embolic phenomena. ENT: Oropharynx clear, without erythema, exudate, or thrush. No tenderness of sinuses. Mouth/throat: mucosa pink and moist. No lesions. Dentition in good repair. Neck:Supple, without lymphadenopathy. Thyroid normal, No bruits. Pulmonary/Chest: Clear to auscultation, without wheezes, rales, or rhonchi. No dullness to percussion. Cardiovascular: Regular rate and rhythm without murmurs, rubs, or gallops. Abdomen: Soft, non tender. Bowel sounds normal. No organomegaly  All four Extremities: No cyanosis, clubbing, edema, or effusions. Neurologic: No gross sensory or motor deficits. Skin: Warm and dry with good turgor. No signs of peripheral arterial or venous insufficiency. No ulcerations. No open wounds. Medical Decision Making -Laboratory:   I have independently reviewed/ordered the following labs:    CBC with Differential:   Recent Labs     12/27/20  0812 12/28/20  0621   WBC 16.6* 16.3*   HGB 13.4 13.2   HCT 40.2* 39.1*    329   LYMPHOPCT 9* 7*   MONOPCT 10* 12*     BMP:   Recent Labs     12/27/20  0812 12/28/20  0621   * 133*   K 4.0 3.6*   CL 94* 95*   CO2 22 22   BUN 14 15   CREATININE 0.57* 0.61*     Hepatic Function Panel:   Recent Labs     12/27/20  0812   PROT 6.8   LABALBU 3.0*   BILITOT 0.45   ALKPHOS 120   ALT 25   AST 17     No results for input(s): RPR in the last 72 hours. No results for input(s): HIV in the last 72 hours. No results for input(s): BC in the last 72 hours.   Lab Results   Component Value Date    MUCUS NOT REPORTED 12/27/2020    RBC 4.42 12/28/2020 TRICHOMONAS NOT REPORTED 12/27/2020    WBC 16.3 12/28/2020    YEAST NOT REPORTED 12/27/2020    TURBIDITY CLEAR 12/27/2020     Lab Results   Component Value Date    CREATININE 0.61 12/28/2020    GLUCOSE 253 12/28/2020       Medical Decision Making-Imaging:       Medical Decision Sjlkty-Ymndsoqh-Scpoj:       Medical Decision Making-Other:     Note:  · Labs, medications, radiologic studies were reviewed with personal review of films  · Large amounts of data were reviewed  · Discussed with nursing Staff, Discharge planner  · Infection Control and Prevention measures reviewed  · All prior entries were reviewed  · Administer medications as ordered  · Prognosis: Fair  · Discharge planning reviewed  · Follow up as outpatient. Thank you for allowing us to participate in the care of this patient. Please call with questions.     [facility] - [facility]  Pager: [phone] - Office: [phone]

## 2020-12-28 NOTE — PROGRESS NOTES
Physical Therapy  DATE: 2020    NAME: Theresa Pan  MRN: [id number]   : 1985    Patient not seen this date for Physical Therapy due to:  [] Blood transfusion in progress  [] Hemodialysis  [] Patient Declined  [] Spine Precautions   [] Strict Bedrest  [] Surgery/ Procedure  [] Testing      [x] Other: plan for OR with NS per chart. PT will check back following post-op needs. [] PT is being discontinued at this time. Patient independent. No further needs. [] PT is being discontinued at this time due to declining physical/ medical status. Therapy is not appropriate at this time.     Parveen Magallanes, PT

## 2020-12-28 NOTE — ED NOTES
Consent for surgery with neurosurgery NP signed and at bedside. Patient to go to surgery this evening.      Jose Mathew RN  12/28/20 [id number]

## 2020-12-28 NOTE — ED NOTES
Morning labs labeled and sent. Pt sleeping in bed. NAD.  Pt states he has no needs at this time     Mookie Etienne RN  12/28/20 [id number]

## 2020-12-28 NOTE — CARE COORDINATION
Case Management Initial Discharge Plan  Marquis Healy,             Met with:patient to discuss discharge plans. Information verified: address, contacts, phone number, , insurance Yes    Emergency Contact/Next of Kin name & number: Mother [phone]    PCP: Evon Osei MD  Date of last visit: 3 months ago    Insurance Provider:[employer]     Discharge Planning    Living Arrangements:  Spouse/Significant Other   Support Systems:  Family Members, Spouse/Significant Other, Children    Home has 2 stories  4 stairs to climb to get into front door, 15 stairs to climb to reach second floor  Location of bedroom/bathroom in home upper    Patient able to perform ADL's:Independent    Current Services (outpatient & in home) none  DME equipment: none  DME provider: none    Receiving oral anticoagulation therapy? No    If indicated:   Physician managing anticoagulation treatment: n/a  Where does patient obtain lab work for ATC treatment? n/a      Potential Assistance Needed:       Patient agreeable to home care: No  [place] of choice provided:  n/a    Prior SNF/Rehab Placement and Facility: no  Agreeable to SNF/Rehab: No  [place] of choice provided: n/a     Evaluation: no    Expected Discharge date:       Patient expects to be discharged to:  home  Follow Up Appointment: Best Day/ Time:      Transportation provider: self/family  Transportation arrangements needed for discharge: No    Readmission Risk              Risk of Unplanned Readmission:        12             Does patient have a readmission risk score greater than 14?: No  If yes, follow-up appointment must be made within 7 days of discharge.      Goals of Care: pain control      Discharge Plan: Home independently pcp established has transportation          Electronically signed by Delfin Bray RN on 20 at 4:17 PM EST

## 2020-12-28 NOTE — ANESTHESIA PRE PROCEDURE
Department of Anesthesiology  Preprocedure Note       Name:  Albin Siddiqui   Age:  38 y.o.  :  1985                                          MRN:  [id number]         Date:  2020      Surgeon: Megan Hidalgo):  Jacy Nguyen MD    Procedure: Procedure(s):  POSTERIOR THORACIC LAMINECTOMY  T10-T12    Medications prior to admission:   Prior to Admission medications    Not on File       Current medications:    Current Facility-Administered Medications   Medication Dose Route Frequency Provider Last Rate Last Admin    lactated ringers infusion   Intravenous Continuous Adriane Flores  mL/hr at 20 0730 New Bag at 20 0730    enoxaparin (LOVENOX) injection 40 mg  40 mg Subcutaneous Daily Estefanía Dumont MD   Stopped at 20 1552    melatonin tablet 5 mg  5 mg Oral Once Eva Baer MD        insulin lispro (HUMALOG) injection vial 0-6 Units  0-6 Units Subcutaneous TID WC Eva Baer MD   Stopped at 20 1622    insulin lispro (HUMALOG) injection vial 0-3 Units  0-3 Units Subcutaneous Nightly Eva Baer MD        sodium chloride flush 0.9 % injection 10 mL  10 mL Intravenous 2 times per day Estefanía Dumont MD   Stopped at 20    sodium chloride flush 0.9 % injection 10 mL  10 mL Intravenous PRN Estefanía Dumont MD        potassium chloride (KLOR-CON M) extended release tablet 40 mEq  40 mEq Oral PRN Estefanía Dumont MD        Or    potassium bicarb-citric acid (EFFER-K) effervescent tablet 40 mEq  40 mEq Oral PRN Estefanía Dumont MD        Or    potassium chloride 10 mEq/100 mL IVPB (Peripheral Line)  10 mEq Intravenous PRN Estefanía Dumont MD        magnesium sulfate 1 g in dextrose 5% 100 mL IVPB  1 g Intravenous PRN Estefanía Dumont MD        promethazine (PHENERGAN) tablet 12.5 mg  12.5 mg Oral Q6H PRN Estefanía Dumont MD        Or    ondansetron (ZOFRAN) injection 4 mg  4 mg Intravenous Q6H PRN Estefanía Dumont MD   4 mg at 20  polyethylene glycol (GLYCOLAX) packet 17 g  17 g Oral Daily PRN Claudine Rios MD        nicotine (NICODERM CQ) 21 MG/24HR 1 patch  1 patch Transdermal Daily PRN Claudine Rios MD        acetaminophen (TYLENOL) tablet 650 mg  650 mg Oral Q6H PRN Claudine Rios MD        Or    acetaminophen (TYLENOL) suppository 650 mg  650 mg Rectal Q6H PRN Claudine Rios MD        fentaNYL (SUBLIMAZE) injection 25 mcg  25 mcg Intravenous Q2H PRN Claudine Rios MD   25 mcg at 12/28/20 1252    morphine injection 2 mg  2 mg Intravenous Q4H PRN Claudine Rios MD   2 mg at 12/28/20 1050    0.9 % sodium chloride bolus  20 mL Intravenous Once Flavia Crowell MD        cefepime (MAXIPIME) 2 g IVPB minibag  2 g Intravenous Q12H Brianna Parra MD   Stopped at 12/28/20 1322    melatonin tablet 3 mg  3 mg Oral Nightly PRN Claudine Rios MD   3 mg at 12/27/20 2044    glucose (GLUTOSE) 40 % oral gel 15 g  15 g Oral PRN Estrellita Guardado MD        dextrose 50 % IV solution  12.5 g Intravenous PRN Estrellita Guardado MD        glucagon (rDNA) injection 1 mg  1 mg Intramuscular PRN Estrellita Guardado MD        dextrose 5 % solution  100 mL/hr Intravenous PRN Estrellita Guardado MD        vancomycin [facility]) intermittent dosing (placeholder)   Other RX Placeholder Estrellita Guardado MD        vancomycin (VANCOCIN) 1250 mg in dextrose 5 % 250 mL IVPB  1,250 mg Intravenous Q12H Estrellita Guardado MD   Stopped at 12/28/20 0806    pantoprazole (PROTONIX) tablet 40 mg  40 mg Oral QAM AC Claudine Rios MD   40 mg at 12/28/20 7999     No current outpatient medications on file. Allergies:  No Known Allergies    Problem List:    Patient Active Problem List   Diagnosis Code    Epidural abscess G06.2    Type 2 diabetes mellitus ([facility] [address] E11.9    IV drug abuse ([facility] [address] F19.10       Past Medical History:  No past medical history on file. Past Surgical History:  No past surgical history on file.     Social History:    Social History     Tobacco Use    Smoking status: Not on file Substance Use Topics    Alcohol use: Not on file                                Counseling given: Not Answered      Vital Signs (Current):   Vitals:    12/27/20 1116 12/27/20 1700 12/27/20 1800 12/27/20 2038   BP: (!) 153/83   (!) 150/88   Pulse: 70 68 84 73   Resp:  21 26 20   Temp:       TempSrc:       SpO2: 96% 99% 95%    Weight:       Height:                                                  BP Readings from Last 3 Encounters:   12/27/20 (!) 150/88       NPO Status:                                                                                 BMI:   Wt Readings from Last 3 Encounters:   12/27/20 169 lb (76.7 kg)     Body mass index is 25.7 kg/m².     CBC:   Lab Results   Component Value Date    WBC 16.3 12/28/2020    RBC 4.42 12/28/2020    HGB 13.2 12/28/2020    HCT 39.1 12/28/2020    MCV 88.5 12/28/2020    RDW 11.8 12/28/2020     12/28/2020       CMP:   Lab Results   Component Value Date     12/28/2020    K 3.6 12/28/2020    CL 95 12/28/2020    CO2 22 12/28/2020    BUN 15 12/28/2020    CREATININE 0.61 12/28/2020    GFRAA >60 12/28/2020    LABGLOM >60 12/28/2020    GLUCOSE 257 12/28/2020    PROT 6.8 12/27/2020    CALCIUM 8.6 12/28/2020    BILITOT 0.45 12/27/2020    ALKPHOS 120 12/27/2020    AST 17 12/27/2020    ALT 25 12/27/2020       POC Tests:   Recent Labs     12/28/20  1225   POCGLU 314*       Coags:   Lab Results   Component Value Date    PROTIME 9.9 12/27/2020    INR 0.9 12/27/2020    APTT 24.0 12/27/2020       HCG (If Applicable): No results found for: PREGTESTUR, PREGSERUM, HCG, HCGQUANT     ABGs: No results found for: PHART, PO2ART, RLK4IES, MHU1NCG, BEART, I8ICLKID     Type & Screen (If Applicable):  No results found for: LABABO, LABRH    Drug/Infectious Status (If Applicable):  No results found for: HIV, HEPCAB    COVID-19 Screening (If Applicable):   Lab Results   Component Value Date    COVID19 Not Detected 12/27/2020         Anesthesia Evaluation    Airway: Mallampati: II Neck ROM: full   Dental: normal exam         Pulmonary:Negative Pulmonary ROS breath sounds clear to auscultation  (+) current smoker                           Cardiovascular:Negative CV ROS            Rhythm: regular  Rate: normal                    Neuro/Psych:   (+) neuromuscular disease:,             GI/Hepatic/Renal:   (+) GERD:,           Endo/Other:    (+) Diabetespoorly controlled, , .                 Abdominal:         (-) obese     Vascular: negative vascular ROS. Anesthesia Plan      general     ASA 3 - emergent       Induction: intravenous and rapid sequence. Anesthetic plan and risks discussed with patient. Plan discussed with CRNA.                   Randal Gilford, MD   12/28/2020

## 2020-12-28 NOTE — ED NOTES
Patient given additional warm blankets. He is resting on bed, rr even and unlabored. Will continue with plan of care.        Dequan Patton RN  12/28/20 [id number]

## 2020-12-28 NOTE — ED NOTES
Patient asleep on bed, rr even and unlabored. Will continue with plan of care.      Geno Jim RN  12/28/20 9673

## 2020-12-29 LAB
ABO/RH: NORMAL
ABSOLUTE EOS #: 0 K/UL (ref 0–0.4)
ABSOLUTE IMMATURE GRANULOCYTE: 0 K/UL (ref 0–0.3)
ABSOLUTE LYMPH #: 0.82 K/UL (ref 1–4.8)
ABSOLUTE MONO #: 2.12 K/UL (ref 0.1–0.8)
ANION GAP SERPL CALCULATED.3IONS-SCNC: 17 MMOL/L (ref 9–17)
ANTIBODY SCREEN: NEGATIVE
ARM BAND NUMBER: NORMAL
BASOPHILS # BLD: 1 % (ref 0–2)
BASOPHILS ABSOLUTE: 0.16 K/UL (ref 0–0.2)
BLD PROD TYP BPU: NORMAL
BLD PROD TYP BPU: NORMAL
BUN BLDV-MCNC: 16 MG/DL (ref 6–20)
BUN/CREAT BLD: ABNORMAL (ref 9–20)
CALCIUM SERPL-MCNC: 8.1 MG/DL (ref 8.6–10.4)
CHLORIDE BLD-SCNC: 94 MMOL/L (ref 98–107)
CO2: 18 MMOL/L (ref 20–31)
CREAT SERPL-MCNC: 0.51 MG/DL (ref 0.7–1.2)
CROSSMATCH RESULT: NORMAL
CROSSMATCH RESULT: NORMAL
CULTURE: ABNORMAL
DIFFERENTIAL TYPE: ABNORMAL
DIRECT EXAM: ABNORMAL
DIRECT EXAM: ABNORMAL
DISPENSE STATUS BLOOD BANK: NORMAL
DISPENSE STATUS BLOOD BANK: NORMAL
EOSINOPHILS RELATIVE PERCENT: 0 % (ref 1–4)
EXPIRATION DATE: NORMAL
GFR AFRICAN AMERICAN: >60 ML/MIN
GFR NON-AFRICAN AMERICAN: >60 ML/MIN
GFR SERPL CREATININE-BSD FRML MDRD: ABNORMAL ML/MIN/{1.73_M2}
GFR SERPL CREATININE-BSD FRML MDRD: ABNORMAL ML/MIN/{1.73_M2}
GLUCOSE BLD-MCNC: 242 MG/DL (ref 75–110)
GLUCOSE BLD-MCNC: 272 MG/DL (ref 75–110)
GLUCOSE BLD-MCNC: 277 MG/DL (ref 70–99)
GLUCOSE BLD-MCNC: 281 MG/DL (ref 75–110)
GLUCOSE BLD-MCNC: 290 MG/DL (ref 75–110)
HCT VFR BLD CALC: 39.3 % (ref 40.7–50.3)
HEMOGLOBIN: 12.9 G/DL (ref 13–17)
IMMATURE GRANULOCYTES: 0 %
LYMPHOCYTES # BLD: 5 % (ref 24–44)
Lab: ABNORMAL
Lab: ABNORMAL
MCH RBC QN AUTO: 29.2 PG (ref 25.2–33.5)
MCHC RBC AUTO-ENTMCNC: 32.8 G/DL (ref 28.4–34.8)
MCV RBC AUTO: 88.9 FL (ref 82.6–102.9)
MONOCYTES # BLD: 13 % (ref 1–7)
MORPHOLOGY: NORMAL
NRBC AUTOMATED: 0 PER 100 WBC
PDW BLD-RTO: 11.7 % (ref 11.8–14.4)
PLATELET # BLD: 300 K/UL (ref 138–453)
PLATELET ESTIMATE: ABNORMAL
PMV BLD AUTO: 9.8 FL (ref 8.1–13.5)
POTASSIUM SERPL-SCNC: 3.8 MMOL/L (ref 3.7–5.3)
RBC # BLD: 4.42 M/UL (ref 4.21–5.77)
RBC # BLD: ABNORMAL 10*6/UL
SEG NEUTROPHILS: 81 % (ref 36–66)
SEGMENTED NEUTROPHILS ABSOLUTE COUNT: 13.2 K/UL (ref 1.8–7.7)
SODIUM BLD-SCNC: 129 MMOL/L (ref 135–144)
SPECIMEN DESCRIPTION: ABNORMAL
SPECIMEN DESCRIPTION: ABNORMAL
TRANSFUSION STATUS: NORMAL
TRANSFUSION STATUS: NORMAL
UNIT DIVISION: 0
UNIT DIVISION: 0
UNIT NUMBER: NORMAL
UNIT NUMBER: NORMAL
WBC # BLD: 16.3 K/UL (ref 3.5–11.3)
WBC # BLD: ABNORMAL 10*3/UL

## 2020-12-29 PROCEDURE — 82947 ASSAY GLUCOSE BLOOD QUANT: CPT

## 2020-12-29 PROCEDURE — 2580000003 HC RX 258: Performed by: NEUROLOGICAL SURGERY

## 2020-12-29 PROCEDURE — 6370000000 HC RX 637 (ALT 250 FOR IP): Performed by: NURSE PRACTITIONER

## 2020-12-29 PROCEDURE — 97162 PT EVAL MOD COMPLEX 30 MIN: CPT

## 2020-12-29 PROCEDURE — 80048 BASIC METABOLIC PNL TOTAL CA: CPT

## 2020-12-29 PROCEDURE — 97535 SELF CARE MNGMENT TRAINING: CPT

## 2020-12-29 PROCEDURE — 6370000000 HC RX 637 (ALT 250 FOR IP): Performed by: STUDENT IN AN ORGANIZED HEALTH CARE EDUCATION/TRAINING PROGRAM

## 2020-12-29 PROCEDURE — 6370000000 HC RX 637 (ALT 250 FOR IP): Performed by: NEUROLOGICAL SURGERY

## 2020-12-29 PROCEDURE — 6360000002 HC RX W HCPCS: Performed by: NEUROLOGICAL SURGERY

## 2020-12-29 PROCEDURE — 1200000000 HC SEMI PRIVATE

## 2020-12-29 PROCEDURE — 97530 THERAPEUTIC ACTIVITIES: CPT

## 2020-12-29 PROCEDURE — 2580000003 HC RX 258: Performed by: INTERNAL MEDICINE

## 2020-12-29 PROCEDURE — 6360000002 HC RX W HCPCS: Performed by: INTERNAL MEDICINE

## 2020-12-29 PROCEDURE — 99233 SBSQ HOSP IP/OBS HIGH 50: CPT | Performed by: INTERNAL MEDICINE

## 2020-12-29 PROCEDURE — 51702 INSERT TEMP BLADDER CATH: CPT

## 2020-12-29 PROCEDURE — 85025 COMPLETE CBC W/AUTO DIFF WBC: CPT

## 2020-12-29 PROCEDURE — 86341 ISLET CELL ANTIBODY: CPT

## 2020-12-29 PROCEDURE — 36415 COLL VENOUS BLD VENIPUNCTURE: CPT

## 2020-12-29 PROCEDURE — 97166 OT EVAL MOD COMPLEX 45 MIN: CPT

## 2020-12-29 PROCEDURE — 2580000003 HC RX 258: Performed by: STUDENT IN AN ORGANIZED HEALTH CARE EDUCATION/TRAINING PROGRAM

## 2020-12-29 PROCEDURE — 99232 SBSQ HOSP IP/OBS MODERATE 35: CPT | Performed by: INTERNAL MEDICINE

## 2020-12-29 RX ORDER — POLYETHYLENE GLYCOL 3350 17 G/17G
17 POWDER, FOR SOLUTION ORAL DAILY
Status: DISCONTINUED | OUTPATIENT
Start: 2020-12-29 | End: 2020-12-31 | Stop reason: HOSPADM

## 2020-12-29 RX ORDER — CYCLOBENZAPRINE HCL 10 MG
10 TABLET ORAL 2 TIMES DAILY PRN
Status: DISCONTINUED | OUTPATIENT
Start: 2020-12-29 | End: 2020-12-31 | Stop reason: HOSPADM

## 2020-12-29 RX ORDER — INSULIN GLARGINE 100 [IU]/ML
20 INJECTION, SOLUTION SUBCUTANEOUS DAILY
Status: DISCONTINUED | OUTPATIENT
Start: 2020-12-29 | End: 2020-12-30

## 2020-12-29 RX ORDER — OXYCODONE HYDROCHLORIDE AND ACETAMINOPHEN 5; 325 MG/1; MG/1
2 TABLET ORAL EVERY 6 HOURS PRN
Status: DISCONTINUED | OUTPATIENT
Start: 2020-12-29 | End: 2020-12-31 | Stop reason: HOSPADM

## 2020-12-29 RX ORDER — OXYCODONE HYDROCHLORIDE AND ACETAMINOPHEN 5; 325 MG/1; MG/1
1 TABLET ORAL EVERY 6 HOURS PRN
Status: DISCONTINUED | OUTPATIENT
Start: 2020-12-29 | End: 2020-12-31 | Stop reason: HOSPADM

## 2020-12-29 RX ORDER — DOCUSATE SODIUM 100 MG/1
100 CAPSULE, LIQUID FILLED ORAL DAILY
Status: DISCONTINUED | OUTPATIENT
Start: 2020-12-29 | End: 2020-12-31 | Stop reason: HOSPADM

## 2020-12-29 RX ORDER — SENNA PLUS 8.6 MG/1
1 TABLET ORAL NIGHTLY
Status: DISCONTINUED | OUTPATIENT
Start: 2020-12-29 | End: 2020-12-31 | Stop reason: HOSPADM

## 2020-12-29 RX ORDER — OXYCODONE HYDROCHLORIDE AND ACETAMINOPHEN 5; 325 MG/1; MG/1
1 TABLET ORAL EVERY 4 HOURS PRN
Status: DISCONTINUED | OUTPATIENT
Start: 2020-12-29 | End: 2020-12-29

## 2020-12-29 RX ADMIN — CYCLOBENZAPRINE 10 MG: 10 TABLET, FILM COATED ORAL at 22:23

## 2020-12-29 RX ADMIN — SODIUM CHLORIDE, POTASSIUM CHLORIDE, SODIUM LACTATE AND CALCIUM CHLORIDE: 600; 310; 30; 20 INJECTION, SOLUTION INTRAVENOUS at 16:49

## 2020-12-29 RX ADMIN — FENTANYL CITRATE 25 MCG: 50 INJECTION, SOLUTION INTRAMUSCULAR; INTRAVENOUS at 10:10

## 2020-12-29 RX ADMIN — CEFAZOLIN 2 G: 10 INJECTION, POWDER, FOR SOLUTION INTRAVENOUS at 22:08

## 2020-12-29 RX ADMIN — INSULIN LISPRO 2 UNITS: 100 INJECTION, SOLUTION INTRAVENOUS; SUBCUTANEOUS at 20:47

## 2020-12-29 RX ADMIN — CEFEPIME HYDROCHLORIDE 2 G: 2 INJECTION, POWDER, FOR SOLUTION INTRAVENOUS at 12:07

## 2020-12-29 RX ADMIN — DOCUSATE SODIUM 100 MG: 100 CAPSULE, LIQUID FILLED ORAL at 09:55

## 2020-12-29 RX ADMIN — PANTOPRAZOLE SODIUM 40 MG: 40 TABLET, DELAYED RELEASE ORAL at 09:55

## 2020-12-29 RX ADMIN — INSULIN LISPRO 3 UNITS: 100 INJECTION, SOLUTION INTRAVENOUS; SUBCUTANEOUS at 16:39

## 2020-12-29 RX ADMIN — INSULIN LISPRO 3 UNITS: 100 INJECTION, SOLUTION INTRAVENOUS; SUBCUTANEOUS at 16:38

## 2020-12-29 RX ADMIN — POLYETHYLENE GLYCOL 3350 17 G: 17 POWDER, FOR SOLUTION ORAL at 09:55

## 2020-12-29 RX ADMIN — CEFAZOLIN 2 G: 10 INJECTION, POWDER, FOR SOLUTION INTRAVENOUS at 15:35

## 2020-12-29 RX ADMIN — OXYCODONE HYDROCHLORIDE AND ACETAMINOPHEN 2 TABLET: 5; 325 TABLET ORAL at 18:33

## 2020-12-29 RX ADMIN — CYCLOBENZAPRINE 10 MG: 10 TABLET, FILM COATED ORAL at 10:10

## 2020-12-29 RX ADMIN — FENTANYL CITRATE 25 MCG: 50 INJECTION, SOLUTION INTRAMUSCULAR; INTRAVENOUS at 19:59

## 2020-12-29 RX ADMIN — INSULIN LISPRO 3 UNITS: 100 INJECTION, SOLUTION INTRAVENOUS; SUBCUTANEOUS at 12:18

## 2020-12-29 RX ADMIN — Medication 10 ML: at 09:59

## 2020-12-29 RX ADMIN — INSULIN LISPRO 2 UNITS: 100 INJECTION, SOLUTION INTRAVENOUS; SUBCUTANEOUS at 12:16

## 2020-12-29 RX ADMIN — Medication 10 ML: at 20:03

## 2020-12-29 RX ADMIN — MORPHINE SULFATE 2 MG: 4 INJECTION, SOLUTION INTRAMUSCULAR; INTRAVENOUS at 00:40

## 2020-12-29 RX ADMIN — Medication 1250 MG: at 05:52

## 2020-12-29 RX ADMIN — INSULIN LISPRO 3 UNITS: 100 INJECTION, SOLUTION INTRAVENOUS; SUBCUTANEOUS at 10:13

## 2020-12-29 RX ADMIN — CEFEPIME HYDROCHLORIDE 2 G: 2 INJECTION, POWDER, FOR SOLUTION INTRAVENOUS at 00:30

## 2020-12-29 RX ADMIN — FENTANYL CITRATE 25 MCG: 50 INJECTION, SOLUTION INTRAMUSCULAR; INTRAVENOUS at 17:05

## 2020-12-29 RX ADMIN — INSULIN GLARGINE 20 UNITS: 100 INJECTION, SOLUTION SUBCUTANEOUS at 10:14

## 2020-12-29 RX ADMIN — FENTANYL CITRATE 25 MCG: 50 INJECTION, SOLUTION INTRAMUSCULAR; INTRAVENOUS at 13:33

## 2020-12-29 RX ADMIN — SODIUM CHLORIDE, PRESERVATIVE FREE 10 ML: 5 INJECTION INTRAVENOUS at 10:00

## 2020-12-29 RX ADMIN — OXYCODONE HYDROCHLORIDE AND ACETAMINOPHEN 2 TABLET: 5; 325 TABLET ORAL at 05:28

## 2020-12-29 RX ADMIN — SODIUM CHLORIDE, PRESERVATIVE FREE 10 ML: 5 INJECTION INTRAVENOUS at 20:03

## 2020-12-29 RX ADMIN — OXYCODONE HYDROCHLORIDE AND ACETAMINOPHEN 2 TABLET: 5; 325 TABLET ORAL at 12:04

## 2020-12-29 ASSESSMENT — PAIN DESCRIPTION - LOCATION
LOCATION: BACK

## 2020-12-29 ASSESSMENT — PAIN DESCRIPTION - FREQUENCY
FREQUENCY: CONTINUOUS
FREQUENCY: CONTINUOUS

## 2020-12-29 ASSESSMENT — PAIN SCALES - GENERAL
PAINLEVEL_OUTOF10: 9
PAINLEVEL_OUTOF10: 9
PAINLEVEL_OUTOF10: 7
PAINLEVEL_OUTOF10: 8
PAINLEVEL_OUTOF10: 9
PAINLEVEL_OUTOF10: 8
PAINLEVEL_OUTOF10: 7

## 2020-12-29 ASSESSMENT — PAIN DESCRIPTION - ORIENTATION
ORIENTATION: LOWER

## 2020-12-29 ASSESSMENT — ENCOUNTER SYMPTOMS: TACHYPNEA: 1

## 2020-12-29 ASSESSMENT — PAIN DESCRIPTION - PAIN TYPE: TYPE: CHRONIC PAIN;SURGICAL PAIN

## 2020-12-29 NOTE — PROGRESS NOTES
[facility] - Progress Note    Today's Date and Time: 12/29/2020, 9:13 AM    Impression :   · History of IV drug abuse  · Thoracic epidural abscess with possible cord compression at T10 T12 level  · Back pain  · S aureus septicemia 12-27-20    Recommendations:   · Dicontinue vancomycin and cefepime  · Cefazolin 2 gm IV q 8 hr. Stop date 1-27-21    Medical Decision Making/Summary/Discussion:12/29/2020     ·   Infection Control Recommendations   · [place] Precautions  · Contact Isolation MRSA    Antimicrobial Stewardship Recommendations     · Simplification of therapy  · Targeted therapy    Coordination of Outpatient Care:   · Estimated Length of IV antimicrobials: TBD  · Patient will need Midline Catheter Insertion: TBD  · Patient will need PICC line Insertion:TBD  · Patient will need: Home IV , [place],  SNF,  LTAC:TBD  · Patient will need outpatient wound care:TBD    Chief complaint/reason for consultation:   · Epidural abscess of the thoracic area      History of Present Illness:   Zarina Hills is a 38y.o.-year-old male who was initially admitted on 12/27/2020. Patient seen at the request of Hannah Mayen. INITIAL HISTORY:    Patient was transferred from [facility] because of the presence of an epidural abscess. The patient has had past history of IV drug abuse. He had developed back pain over the last few days and presented to [facility]. A CT scan at [facility] show the presence of an epidural abscess with possible cord compression noted at T9 T11 level. Patient was transferred to [place] [address] for neurosurgical care. The patient denied any fevers, chills, night sweats. He indicated the presence of severe back pain. He denied any loss of function of the arms or legs. He denied any sphincter dysfunction. Patient has been evaluated by the neurosurgical service will plan to do a decompressive procedure on 12/28/2020. The patient had blood cultures obtained and he has been placed on Zosyn and vancomycin pending further data. At [facility] he is being continued on vancomycin on place on cefepime. He has a prior history of MRSA. His Covid test was negative on 12/27/2020. CURRENT EVALUATION : 12/29/2020    Afebrile  VS stable    T9-11 laminectomy done 12/29/2020, cultures from the surgical drainage showed gram + cocci in clusters. Blood cultures showed MSSA. Pt states back pain over the site of the incision, but the pain is much improved since yesterday. Denies chest pain, shortness of breath, nausea, vomiting, diarrhea. Labs, X rays reviewed: 12/29/2020    BUN: 14  Cr:0.57    WBC: 16.6  Hb: 13.4  Plat: 312    Cultures:  Urine:  · 12-27-20 pending  Blood:  · 12-27-20 pending  · 12-29-20 MSSA  Sputum :  ·   Wound:  · 12-27-20 : lt flank swab,  s aureus heavy growth  · 12-29-20 drainage from laminectomy: gram + cocci in clusters      I have personally reviewed the past medical history, past surgical history, medications, social history, and family history, and I have updated the database accordingly.   Past Medical History:     Past Medical History:   Diagnosis Date    Type 1 diabetes mellitus ([facility] [address]     dx 2016       Past Surgical  History:     Past Surgical History:   Procedure Laterality Date    FOOT DEBRIDEMENT Right     2018    THORACIC SPINE SURGERY  12/28/2020    POSTERIOR THORACIC LAMINECTOMY  T10-T12, EVACUATION OF ABSCESS        Medications:      senna  1 tablet Oral Nightly    polyethylene glycol  17 g Oral Daily    docusate sodium  100 mg Oral Daily    insulin glargine  20 Units Subcutaneous Daily    insulin lispro  3 Units Subcutaneous TID WC    melatonin  5 mg Oral Once    insulin lispro  0-6 Units Subcutaneous TID WC    insulin lispro  0-3 Units Subcutaneous Nightly    sodium chloride flush  10 mL Intravenous 2 times per day    sodium chloride flush  10 mL Intravenous 2 times per day  sodium chloride  20 mL Intravenous Once    cefepime  2 g Intravenous Q12H    vancomycin (VANCOCIN) intermittent dosing (placeholder)   Other RX Placeholder    vancomycin  1,250 mg Intravenous Q12H    pantoprazole  40 mg Oral QAM AC       Social History:     Social History     Socioeconomic History    Marital status: Unknown     Spouse name: Not on file    Number of children: Not on file    Years of education: Not on file    Highest education level: Not on file   Occupational History    Not on file   Social Needs    Financial resource strain: Not on file    Food insecurity     Worry: Not on file     Inability: Not on file    Transportation needs     Medical: Not on file     Non-medical: Not on file   Tobacco Use    Smoking status: Current Every Day Smoker     Packs/day: 0.50     Years: 20.00     Pack years: 10.00    Tobacco comment: 1/2 pack per day   Substance and Sexual Activity    Alcohol use: Never     Frequency: Never    Drug use: Yes     Comment: Heroin use in 2006    Sexual activity: Not on file   Lifestyle    Physical activity     Days per week: Not on file     Minutes per session: Not on file    Stress: Not on file   Relationships    Social connections     Talks on phone: Not on file     Gets together: Not on file     Attends Sabianist service: Not on file     Active member of club or organization: Not on file     Attends meetings of clubs or organizations: Not on file     Relationship status: Not on file    Intimate partner violence     Fear of current or ex partner: Not on file     Emotionally abused: Not on file     Physically abused: Not on file     Forced sexual activity: Not on file   Other Topics Concern    Not on file   Social History Narrative    Not on file       Family History:   No family history on file.      Allergies:   Bactrim [sulfamethoxazole-trimethoprim] and Toradol [ketorolac tromethamine]     Review of Systems: Constitutional: No fevers or chills. No systemic complaints  Head: No headaches  Eyes: No double vision or blurry vision. No conjunctival inflammation. ENT: No sore throat or runny nose. . No hearing loss, tinnitus or vertigo. Cardiovascular: No chest pain or palpitations. No shortness of breath. No ALMEIDA  Lung: No shortness of breath or cough. No sputum production  Abdomen: No nausea, vomiting, diarrhea, or abdominal pain. Corey Vuong No cramps. Genitourinary: No increased urinary frequency, or dysuria. No hematuria. No suprapubic or CVA pain  Musculoskeletal: No muscle aches or pains. No joint effusions, swelling or deformities. Back pain in the thoracic area - improved since yesterday. Hematologic: No bleeding or bruising. Neurologic: No headache, weakness, numbness, or tingling. Integument: No rash, no ulcers. Psychiatric: No depression. Endocrine: No polyuria, no polydipsia, no polyphagia. Physical Examination :     Patient Vitals for the past 8 hrs:   BP Temp Temp src Pulse Resp SpO2   12/29/20 0741  98.4 °F (36.9 °C)       12/29/20 0438 132/77 98.7 °F (37.1 °C) Oral 71 (!) 0 97 %     General Appearance: Awake, alert, and in no apparent distress  Head:  Normocephalic, no trauma  Eyes: Pupils equal, round, reactive to light and accommodation; extraocular movements intact; sclera anicteric; conjunctivae pink. No embolic phenomena. ENT: Oropharynx clear, without erythema, exudate, or thrush. No tenderness of sinuses. Mouth/throat: mucosa pink and moist. No lesions. Dentition in good repair. Neck:Supple, without lymphadenopathy. Thyroid normal, No bruits. Pulmonary/Chest: Clear to auscultation, without wheezes, rales, or rhonchi. No dullness to percussion. Cardiovascular: Regular rate and rhythm without murmurs, rubs, or gallops. Abdomen: Soft, non tender. Bowel sounds normal. No organomegaly  All four Extremities: No cyanosis, clubbing, edema, or effusions. Neurologic: No gross sensory or motor deficits. Skin: Warm and dry with good turgor. No signs of peripheral arterial or venous insufficiency. No ulcerations. Dressing on the back in the thoracic area over the site of the laminectomy. Medical Decision Making -Laboratory:   I have independently reviewed/ordered the following labs:    CBC with Differential:   Recent Labs     12/27/20  0812 12/28/20  0621   WBC 16.6* 16.3*   HGB 13.4 13.2   HCT 40.2* 39.1*    329   LYMPHOPCT 9* 7*   MONOPCT 10* 12*     BMP:   Recent Labs     12/27/20  0812 12/28/20  0621   * 133*   K 4.0 3.6*   CL 94* 95*   CO2 22 22   BUN 14 15   CREATININE 0.57* 0.61*     Hepatic Function Panel:   Recent Labs     12/27/20  0812   PROT 6.8   LABALBU 3.0*   BILITOT 0.45   ALKPHOS 120   ALT 25   AST 17     No results for input(s): RPR in the last 72 hours. No results for input(s): HIV in the last 72 hours. No results for input(s): BC in the last 72 hours. Lab Results   Component Value Date    MUCUS NOT REPORTED 12/27/2020    RBC 4.42 12/28/2020    TRICHOMONAS NOT REPORTED 12/27/2020    WBC 16.3 12/28/2020    YEAST NOT REPORTED 12/27/2020    TURBIDITY CLEAR 12/27/2020     Lab Results   Component Value Date    CREATININE 0.61 12/28/2020    GLUCOSE 257 12/28/2020       Medical Decision Making-Imaging:       Medical Decision [id number]-[id number]-[id number]:       Medical Decision Making-Other:     Note:  · Labs, medications, radiologic studies were reviewed with personal review of films  · Large amounts of data were reviewed  · Discussed with nursing Staff, Discharge planner  · Infection Control and Prevention measures reviewed  · All prior entries were reviewed  · Administer medications as ordered  · Prognosis: Fair  · Discharge planning reviewed  · Follow up as outpatient. Thank you for allowing us to participate in the care of this patient. Please call with questions.     Mirella Fischer-MS4    ATTESTATION: I have discussed the case, including pertinent history and exam findings with the residents and students. I have seen and examined the patient and the key elements of the encounter have been performed by me. I was present when the student obtained his information or examined the patient. I have reviewed the laboratory data, other diagnostic studies and discussed them with the residents. I have updated the medical record where necessary. I agree with the assessment, plan and orders as documented by the resident/ student.     Kristy Garcia MD.

## 2020-12-29 NOTE — ED NOTES
Patient still in [address] [place] [address]. Report to Christine Shay RN.      James Valadez RN  12/28/20 1919

## 2020-12-29 NOTE — PROGRESS NOTES
Physician Progress Note      PATIENT:               Jorge Peterson  CSN #:                  [id number]  :                       1985  ADMIT DATE:       2020 7:38 AM  DISCH DATE:  RESPONDING  PROVIDER #:        Verner Calender MD          QUERY TEXT:    Pt admitted with epidural abscess. Pt noted to have WBC- 16.3-16.6, CRP- 85.4,   temp - 100.9. If possible, please document in the progress notes and   discharge summary if you are evaluating and /or treating any of the following: The medical record reflects the following:  Risk Factors: epidural abscess, history of IV drug abuse , and MRSA  Clinical Indicators: MRI spine from outside facility -   T10-T12 spinal   epidural fluid collection. pt developed worsening of the lower extremities   through out the day per notes and taken for emergent laminectomy and abscess   evacuation,  Per OP notes noted to have purulent drainage after removal of   spinous process with the placement of the Vancomycin beads into the surgical   site. and 3L lavage of the wound  with cord decompression. WBC- 16.3-16.6,   CRP- 85.4, procalcitonin- 0.28, ESR- 64, HGB A1C - 12.5, glucose - 301, wound   cultures - gram positive cocci in clusters. Treatment: laminectomy of the T9-T 11with cored compression. IV - Vancomycin,   Zosyn -> Cefepime, Insulin, monitor of labs.     thank you, Hang Johnson, [facility] office [phone]  Options provided:  -- Sepsis due to epidural abscess, present on admission  -- Sepsis due to epidural abscess, present on admission, now resolved  -- Sepsis due to epidural abscess, not present on admission  -- No Sepsis, epidural abscess only  -- Other - I will add my own diagnosis  -- Disagree - Not applicable / Not valid  -- Disagree - Clinically unable to determine / Unknown  -- Refer to Clinical Documentation Reviewer    PROVIDER RESPONSE TEXT:    This patient was treated for Sepsis due to epidural abscess this admission, which was present on admission and is currently resolved.     Query created by: Antoine Nissen on 12/29/2020 9:18 AM      Electronically signed by:  Francesca Dodson MD 12/29/2020 4:15 PM

## 2020-12-29 NOTE — OP NOTE
[facility]                                OPERATIVE REPORT    PATIENT NAME: Troy Pabon                       :        1985  MED REC NO:   [id number]                             ROOM:       [id number]  ACCOUNT NO:   [de-identified]                           ADMIT DATE: 2020  PROVIDER:     Kimberly Case    DATE OF PROCEDURE:  2020    PREOPERATIVE DIAGNOSIS:  Epidural abscess, T10 through T12. POSTOPERATIVE DIAGNOSIS:  Epidural abscess, T10 through T12. PROCEDURE:  T10 laminectomy and evacuation of epidural abscess. ESTIMATED BLOOD LOSS:  Less than 100 mL. SURGEON:  Mylene Willoughby. Mary Abel MD    INDICATION FOR SURGERY:  A patient who presents with back pain and an  MRI that shows an epidural abscess. He began having some numbness and  tingling in his leg. It was elected to take him urgently to surgery. DESCRIPTION OF OPERATION:  After an adequate level of general  endotracheal anesthesia had been obtained, the patient was turned prone  on the [facility] table, prepared and draped in the usual sterile fashion. An incision was made over the spinous processes of T10 through T12,  carried down to the lumbar fascia, which was then opened, and  paraspinous muscles subperiosteally elevated bilaterally. Intraoperative radiographs were obtained in order to confirm the  appropriate level. Spinous processes of T9, T10, and T11 were removed. Upon removing the T9 and T10 spinous processes, a large amount of  purulent material was identified. Cultures were sent for aerobic and  anaerobic cultures. The lamina was then removed. There was a loculated  area of purulence, which was evacuated, and the cord was decompressed. I carried the laminectomy cephalad until the loculated fluid was gone. There was no further pus.   The wound was then irrigated with 3 L of saline solution using Pulsavac. A gram of vancomycin was deposited into  the wound. A Guille-Gonzales drain was placed, exited through a separate  stab wound. Incision was closed in layers. Sterile dressing was  applied. The patient was awakened in the operating room and taken to  the recovery room in good condition.         Carine Oneal    D: 12/28/2020 19:40:10       T: 12/28/2020 22:23:05     [email or web address]  Job#: [id number]     Doc#: [id number]    CC:

## 2020-12-29 NOTE — PROGRESS NOTES
Physical Therapy    Facility/Department: [facility] NEURO  Initial Assessment    NAME: Chris Pittman  : 1985  MRN: [id number]    Date of Service: 2020  Chief Complaint   Patient presents with    Back Pain     From [facility], epidural abscess with cord compression at T9-T11. Received 4.5 g Zosyn, 150 mg Ketamine, 2 mg Dilaudid, 8 mg Versed, and Vanco started PTA   T 10 Laminectomy and evacuation of epidural abscess performed 20    Discharge Recommendations:  Patient would benefit from continued therapy after discharge   PT Equipment Recommendations  Equipment Needed: Yes  Mobility Devices: Rodríguez Page: Rolling    Assessment   Body structures, Functions, Activity limitations: Decreased functional mobility ; Decreased strength;Decreased endurance;Decreased balance; Increased pain  Assessment: Pt with noted impaired mobility with back pain. Pt requires mostly CGA to ambulate and for transfer today. Pt with noted LE weakness and some buckling during gait but noted controlled buckling. Pt with supportive family to assist him at discharge between his mother and significant other. Pt may benefit from further therapy at discharge- will continue to assess based on overall improvements with mobility while in the hospital.  Prognosis: Good  Decision Making: Medium Complexity  PT Education: Goals;PT Role;Plan of Care;Transfer Training;Functional Mobility Training;Gait Training  Barriers to Learning: None  REQUIRES PT FOLLOW UP: Yes  Activity Tolerance  Activity Tolerance: Patient limited by fatigue;Patient limited by pain       Patient Diagnosis(es): The encounter diagnosis was Epidural abscess. has a past medical history of Type 1 diabetes mellitus ([facility] [address] has a past surgical history that includes Foot Debridement (Right); Thoracic spine surgery (2020); and LAMINECTOMY POSTERIOR THORACIC / LUMBAR (N/A, 2020).     Restrictions  Restrictions/Precautions AM-PAC Inpatient Mobility Raw Score : 16 (12/29/20 1501)  AM-PAC Inpatient T-Scale Score : 40.78 (12/29/20 1501)  Mobility Inpatient CMS 0-100% Score: 54.16 (12/29/20 1501)  Mobility Inpatient CMS G-Code Modifier : CK (12/29/20 1501)          Goals  Short term goals  Time Frame for Short term goals: 14 visits  Short term goal 1: Pt to ambulate 300ft modified independently with least restrictive device  Short term goal 2: Pt to sit <> stand transfer independently  Short term goal 3: Pt to demonstrate independent bed mobility  Short term goal 4: Pt to ascend/descend flight of stairs with rail modified independently to allow for access to bedroom level  Short term goal 5: Pt to demonstrate good standing balance to decrease risk of falls  Patient Goals   Patient goals : Pt would like to get back home       Therapy Time   Individual Concurrent Group Co-treatment   Time In 1417         Time Out 1446         Minutes 29         Timed Code Treatment Minutes: R Robert 21, PT

## 2020-12-29 NOTE — PROGRESS NOTES
Occupational Therapy   Occupational Therapy Initial Assessment  Date: 2020   Patient Name: Rodrigo Leiva  MRN: [id number]     : 1985    Date of Service: 2020    Discharge Recommendations: Further therapy recommended at discharge. Pt doing well overall, likely to improve quickly. OT Equipment Recommendations  Equipment Needed: Yes  Mobility Devices: ADL Assistive Devices  ADL Assistive Devices: Transfer Tub Bench    Assessment   Performance deficits / Impairments: Decreased functional mobility ; Decreased endurance;Decreased ADL status; Decreased high-level IADLs;Decreased balance  Prognosis: Good  Decision Making: Medium Complexity  OT Education: OT Role;Plan of Care;ADL Adaptive Strategies;Transfer Training;Precautions  REQUIRES OT FOLLOW UP: Yes  Activity Tolerance  Activity Tolerance: Patient Tolerated treatment well;Patient limited by pain  Safety Devices  Safety Devices in place: Yes  Type of devices: All fall risk precautions in place;Call light within reach;Gait belt;Left in chair  Restraints  Initially in place: No         Patient Diagnosis(es): The encounter diagnosis was Epidural abscess. has a past medical history of Type 1 diabetes mellitus ([facility] [address] has a past surgical history that includes Foot Debridement (Right); Thoracic spine surgery (2020); and LAMINECTOMY POSTERIOR THORACIC / LUMBAR (N/A, 2020).          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 of IV drug abuse  Patient Currently in Pain: Yes  Pain Assessment  Pain Assessment: 0-10  Pain Level: 6 LE Bathing: Stand by assistance; Increased time to complete  UE Dressing: Supervision; Increased time to complete  LE Dressing: Stand by assistance; Increased time to complete  Toileting: Stand by assistance; Increased time to complete  Additional Comments: Pt stood sinkside for oral care activity, stood sinkside for unsuccessful urination, pt doffed/odnned R sock sitting EOB with good figure-4 technique noted  Tone RUE  RUE Tone: Normotonic  Tone LUE  LUE Tone: Normotonic  Coordination  Movements Are Fluid And Coordinated: No  Coordination and Movement description: Decreased speed     Bed mobility  Supine to Sit: Minimal assistance  Sit to Supine: Unable to assess  Scooting: Supervision  Comment: Pt educated on and demo'd good log roll technique this date, pt retired sitting in recliner with call light  Transfers  Sit to stand: Stand by assistance  Stand to sit: Supervision     Cognition  Overall Cognitive Status: WFL        Sensation  Overall Sensation Status: WFL      LUE AROM (degrees)  LUE AROM : WFL  RUE AROM (degrees)  RUE AROM : WFL  LUE Strength  Gross LUE Strength: WFL  L Shoulder Flex: NT  L Elbow Flex: NT  L Elbow Ext: NT  L Hand General: 5/5  LUE Strength Comment: Not tested d/t recent back sx  RUE Strength  Gross RUE Strength: WFL  R Shoulder Flex: NT  R Elbow Flex: NT  R Elbow Ext: NT  R Hand General: 5/5  RUE Strength Comment: Not tested d/t recent back sx      Plan   Plan  Times per week: 3-5x    AM-PAC Score        AM-[facility] Inpatient Daily Activity Raw Score: 20 (12/29/20 1313)  AM-PAC Inpatient ADL T-Scale Score : 42.03 (12/29/20 1313)  ADL Inpatient CMS 0-100% Score: 38.32 (12/29/20 1313)  ADL Inpatient CMS G-Code Modifier : Nai Ta (12/29/20 1313)    Goals  Short term goals  Time Frame for Short term goals: Pt will by discharge  Short term goal 1: demo good safety awareness during func mob around room (I)  Short term goal 2: identify 3 positions to avoid to reduce back pain with 1 vc (no BLT) Short term goal 3: demo ADL UB/LB dressing/bathing activity with mod I and increased time, adaptive tech's  Short term goal 4: demo log roll during bed mobility with 1 vc and SBA  Short term goal 5: identify 2 non-pharmacological pain-relieving tech's with 1 vc     Therapy Time   Individual Concurrent Group Co-treatment   Time In 1048         Time Out 1120         Minutes 32         Timed Code Treatment Minutes: 27 Minutes       Denver Neas, OTR/L

## 2020-12-29 NOTE — PROGRESS NOTES
Neurosurgery Post op Progress Note      POD# 0    s/p posterior thoracic laminectomy T10-12 evacuation of abscess    SUBJECTIVE:      Patient transferred from [facility] due to having worsening lower back pain for 3 to 4 days. Given his concerning history of IV drug abuse, patient had an MRI which revealed a T10-12 spinal epidural fluid condition concerning for abscess but had no signs of cauda equina. Patient evaluated at [facility] emergency medical department after being transferred for neurosurgical evaluation. Throughout the course of today, patient start developing worsening weakness in his lower extremities and was taken to an emergency thoracic laminectomy and abscess evacuation. Patient states that he is doing significantly better and his weakness has resolved. Patient is denying any, fevers, chills, chest pain, shortness breath, nausea/vomiting, dysuria, or diarrhea.       OBJECTIVE      Physical exam   VITALS:    Vitals:    12/28/20 2015   BP: (!) 157/99   Pulse: 69   Resp: 22   Temp:    SpO2: 100%     INTAKE:      Intake/Output Summary (Last 24 hours) at 12/29/2020 0133  Last data filed at 12/28/2020 1925  Gross per 24 hour   Intake 1300 ml   Output 2150 ml   Net -850 ml            Neurological exam   alert, oriented x3, affect appropriate, no focal neurological deficits, moves all extremities well, no involuntary movements and reflexes at knee and ankle intact; normal muscle tone, no tremors, strength 5/5 in all extremities      Wound   Post op wound:    Dressing is clean/dry/intact with no signs of drainage     ASSESSMENT AND PLAN    38 y.o. male status post T post op day # 0    - Analgesia: Flexeril 10 mg 3 times daily as needed   - Periop Antibiotics: Continue cefepime per ID  - Medical management per primary team  - Activity: As tolerated  - DVT prophylaxis: SCDs  - Diet:  Advance as tolerated  - GI prophylaxis: None Electronically signed by Jesica Saravia DO on 12/29/2020 at 1:33 AM

## 2020-12-29 NOTE — BRIEF OP NOTE
Brief Postoperative Note      Patient: Jamal Carlos  YOB: 1985  MRN: [id number]    Date of Procedure: 12/28/2020    Pre-Op Diagnosis: ABSCESS THORACIC SPINE    Post-Op Diagnosis: Same       Procedure(s):  POSTERIOR THORACIC LAMINECTOMY  T10-T12, EVACUATION OF ABSCESS    Surgeon(s):  Luis Ryan MD    Assistant:  * No surgical staff found *    Anesthesia: General    Estimated Blood Loss (mL): less than 714     Complications: None    Specimens:   ID Type Source Tests Collected by Time Destination   1 : T10 ABSCESS (1) Swab Spine CULTURE, ANAEROBIC AND AEROBIC Luis Ryan MD 12/28/2020 1853    2 : T10 ABSCESS (2) Swab Spine CULTURE, ANAEROBIC AND AEROBIC Luis Ryan MD 12/28/2020 1854    3 : URINE FOR CULTURE Urine Urine, indwelling catheter CULTURE, URINE Luis Ryan MD 12/28/2020 1906        Implants:  * No implants in log *      Drains:   Closed/Suction Drain Back Bulb 7 [place] (Active)       Urethral Catheter Coude 14 fr (Active)       Findings: Purulent material loculated at T10    Electronically signed by Luis Ryan MD on 12/28/2020 at 7:24 PM

## 2020-12-29 NOTE — PROGRESS NOTES
[facility]  Internal Medicine Teaching Residency Program  Inpatient Daily Progress Note  ______________________________________________________________________________    Patient: Aliza Abt  YOB: 1985   ZKB:[id number]    Acct: [de-identified]     Room: [facility]6653-  Admit date: 12/27/2020  Today's date: 12/29/20  Number of days in the hospital: 2    SUBJECTIVE   Admitting Diagnosis: Epidural abscess  CC: Low back pain  Pt examined at bedside. Chart & results reviewed. Patient is postop after T10 laminectomy and epidural abscess evacuation on 12/28/2020  Cultures growing staph aureus. Sensitivities pending  Patient states he is doing better after the surgery  Vitals are stable  Has not self voided yet today  Has some numbness tingling in both feet lower extremities but improving  Blood Sugar this morning in 200s    ROS:  Constitutional:  negative for chills, fevers, sweats  Respiratory:  negative for cough, dyspnea on exertion, hemoptysis, shortness of breath, wheezing  Cardiovascular:  negative for chest pain, chest pressure/discomfort, lower extremity edema, palpitations  Gastrointestinal:  negative for abdominal pain, constipation, diarrhea, nausea, vomiting  Neurological:  negative for dizziness, headache  BRIEF HISTORY     The patient is a pleasant 35 y. o. male PMH significant for diabetes mellitus type 2, IV drug use presents with a chief complaint of lower back pain for past several days.     According to the patient, he started having lower back pain 3 to 4 days back, but not associated with any fevers, chills, numbness, tingling, weakness of bilateral lower legs.  [facility] contrast finding of T10-T12 spinal epidural fluid collection. Elizabeth Kolb denied any saddle anesthesia, urinary or fecal incontinence.  Patient states that he last used IV drugs in 2014.      sodium chloride flush  10 mL Intravenous 2 times per day    sodium chloride  20 mL Intravenous Once    pantoprazole  40 mg Oral QAM AC     Continuous Infusions:    lactated ringers 100 mL/hr at 12/29/20 0958    dextrose       PRN Medications    magnesium hydroxide, 15 mL, Q8H PRN      cyclobenzaprine, 10 mg, BID PRN      oxyCODONE-acetaminophen, 1 tablet, Q6H PRN    Or      oxyCODONE-acetaminophen, 2 tablet, Q6H PRN      sodium chloride flush, 10 mL, PRN      sodium chloride flush, 10 mL, PRN      potassium chloride, 40 mEq, PRN    Or      potassium alternative oral replacement, 40 mEq, PRN    Or      potassium chloride, 10 mEq, PRN      magnesium sulfate, 1 g, PRN      promethazine, 12.5 mg, Q6H PRN    Or      ondansetron, 4 mg, Q6H PRN      nicotine, 1 patch, Daily PRN      acetaminophen, 650 mg, Q6H PRN    Or      acetaminophen, 650 mg, Q6H PRN      fentanNYL, 25 mcg, Q2H PRN      melatonin, 3 mg, Nightly PRN      glucose, 15 g, PRN      dextrose, 12.5 g, PRN      glucagon (rDNA), 1 mg, PRN      dextrose, 100 mL/hr, PRN        Diagnostic Labs:  CBC:   Recent Labs     12/27/20  0812 12/28/20  0621 12/29/20  0919   WBC 16.6* 16.3* 16.3*   RBC 4.58 4.42 4.42   HGB 13.4 13.2 12.9*   HCT 40.2* 39.1* 39.3*   MCV 87.8 88.5 88.9   RDW 11.9 11.8 11.7*    329 300     BMP:   Recent Labs     12/27/20  0812 12/28/20  0621 12/29/20  0919   * 133* 129*   K 4.0 3.6* 3.8   CL 94* 95* 94*   CO2 22 22 18*   BUN 14 15 16   CREATININE 0.57* 0.61* 0.51*      ASSESSMENT & PLAN   1. Spinal Epidural abscess secondary to IV drug abuse and uncontrolled diabetes. S/P T10 laminectomy and epidural abscess evacuation 12/28/2020. Postop care and pain management. IV Ancef for MSSA. ID following  2. Diabetes mellitus. Uncontrolled. HbA1c 10.4. Follow LISA-65 antibody. Continue Lantus 20 units daily and lispro 3 units TID AC. Monitor blood sugars  3. Adequate hydration. 4. Bladder scan and Straight cath if unable to void    DVT ppx : Lovenox  GI ppx: Protonix    PT/OT: On board  Discharge Planning / SW: Inpatient for now. Greg Becerril MD  Internal Medicine Resident, PGY-3  [facility]; [address] [place] [address]  12/29/2020, 2:05 PM      Attending Physician Statement  I have discussed the case, including pertinent history and exam findings with the resident and the team.  I have seen and examined the patient and the key elements of the encounter have been performed by me. I agree with the assessment, plan and orders as documented by the resident.       Bre Solorio MD   Attending Physician, Internal Medicine Residency Program  12/29/2020, 4:14 PM

## 2020-12-29 NOTE — CARE COORDINATION
ID following patient, notes state IV antibiotics until 1/27/21. Patient IV drug abuse. Will need some type of plane then home with IV will discuss with ID,  Placing in SNF may be difficult d/t drug abuse.   Will follow with Dr. Daniel Horne for final transition plan

## 2020-12-29 NOTE — PROGRESS NOTES
Neurosurgery Resident    Daily Progress Note     12/29/2020  9:43 AM    No acute events overnight. Patient is postop day 1 from T10 laminectomy and evacuation of epidural abscess. Patient states that he feels better this morning, and that his weakness has significantly improved. Afebrile overnight. Cultures from surgical site shows gram-positive cocci in clusters. Blood culture showing staph aureus. Vitals:    12/29/20 0000 12/29/20 0030 12/29/20 0438 12/29/20 0741   BP: 123/64 127/73 132/77    Pulse: 76 76 71    Resp: 26 16 (!) 0    Temp: 99 °F (37.2 °C) 99 °F (37.2 °C) 98.7 °F (37.1 °C) 98.4 °F (36.9 °C)   TempSrc: Oral Oral Oral    SpO2:   97%    Weight:       Height:           Exam  Gen: Resting comfortably, no acute distress  CV: RRR  Resp: CTAB  GI: Abdomen soft, non-tender  Skin: Cap refill< 2 seconds, surgical incision clean, dry, intact  Neuro:    A&Ox3   PERRL, EOMI   CNII-XII intact   Normal speech and mentation  No focal sensory or motor deficits      Lab Results   Component Value Date    WBC 16.3 (H) 12/28/2020    HGB 13.2 12/28/2020    HCT 39.1 (L) 12/28/2020     12/28/2020    ALT 25 12/27/2020    AST 17 12/27/2020     (L) 12/28/2020    K 3.6 (L) 12/28/2020    CL 95 (L) 12/28/2020    CREATININE 0.61 (L) 12/28/2020    BUN 15 12/28/2020    CO2 22 12/28/2020    INR 0.9 12/27/2020    LABA1C 12.5 (H) 12/27/2020       38 y.o. male who presents with back pain and weakness, MRI showing thoracic epidural abscess.      Labs and imaging were reviewed with Dr. Ayan Virgen     - POD 1 T10 laminectomy and evacuation of epidural abscess   - Pain well controlled with current regimen   - Antibiotics per infectious disease  - Neuro checks per floor protocol  - Medical management per primary team    Please contact neurosurgery with any changes in patients neurologic status      Jenny Jeffries, [facility]  Neurosurgery/Neuro Critical Care Service  Pager [phone]

## 2020-12-29 NOTE — ANESTHESIA POSTPROCEDURE EVALUATION
Department of Anesthesiology  Postprocedure Note    Patient: Lalito Mohamud  MRN: [id number]  [place]: 1985  Date of evaluation: 12/28/2020  Time:  9:00 PM     Procedure Summary     Date: 12/28/20 Room / Location: [address]    Anesthesia Start: 5646 Anesthesia Stop: 1933    Procedure: POSTERIOR THORACIC LAMINECTOMY  T10-T12, EVACUATION OF ABSCESS (N/A Spine Thoracic) Diagnosis: (ABSCESS THORACIC SPINE)    Surgeons: Iesha Price MD Responsible Provider: Odette Velez MD    Anesthesia Type: general ASA Status: 3 - Emergent          Anesthesia Type: general    Alka Phase I: Alka Score: 9    Alka Phase II:      Last vitals: Reviewed and per EMR flowsheets.        Anesthesia Post Evaluation    Patient location during evaluation: PACU  Patient participation: complete - patient participated  Level of consciousness: awake and alert  Pain score: 3  Airway patency: patent  Nausea & Vomiting: no nausea and no vomiting  Complications: no  Cardiovascular status: hemodynamically stable  Respiratory status: acceptable  Hydration status: euvolemic

## 2020-12-30 LAB
-: ABNORMAL
ABSOLUTE EOS #: 0.17 K/UL (ref 0–0.44)
ABSOLUTE IMMATURE GRANULOCYTE: 0.2 K/UL (ref 0–0.3)
ABSOLUTE LYMPH #: 2.1 K/UL (ref 1.1–3.7)
ABSOLUTE MONO #: 1.63 K/UL (ref 0.1–1.2)
AMORPHOUS: ABNORMAL
ANION GAP SERPL CALCULATED.3IONS-SCNC: 12 MMOL/L (ref 9–17)
BACTERIA: ABNORMAL
BASOPHILS # BLD: 0 % (ref 0–2)
BASOPHILS ABSOLUTE: 0.06 K/UL (ref 0–0.2)
BILIRUBIN URINE: NEGATIVE
BUN BLDV-MCNC: 13 MG/DL (ref 6–20)
BUN/CREAT BLD: ABNORMAL (ref 9–20)
CALCIUM SERPL-MCNC: 7.9 MG/DL (ref 8.6–10.4)
CASTS UA: ABNORMAL /LPF (ref 0–2)
CHLORIDE BLD-SCNC: 94 MMOL/L (ref 98–107)
CO2: 24 MMOL/L (ref 20–31)
COLOR: YELLOW
COMMENT UA: ABNORMAL
CREAT SERPL-MCNC: 0.56 MG/DL (ref 0.7–1.2)
CRYSTALS, UA: ABNORMAL /HPF
CULTURE: ABNORMAL
CULTURE: ABNORMAL
DIFFERENTIAL TYPE: ABNORMAL
DIRECT EXAM: ABNORMAL
DIRECT EXAM: ABNORMAL
EOSINOPHILS RELATIVE PERCENT: 1 % (ref 1–4)
EPITHELIAL CELLS UA: ABNORMAL /HPF (ref 0–5)
GFR AFRICAN AMERICAN: >60 ML/MIN
GFR NON-AFRICAN AMERICAN: >60 ML/MIN
GFR SERPL CREATININE-BSD FRML MDRD: ABNORMAL ML/MIN/{1.73_M2}
GFR SERPL CREATININE-BSD FRML MDRD: ABNORMAL ML/MIN/{1.73_M2}
GLUCOSE BLD-MCNC: 242 MG/DL (ref 75–110)
GLUCOSE BLD-MCNC: 274 MG/DL (ref 70–99)
GLUCOSE BLD-MCNC: 275 MG/DL (ref 75–110)
GLUCOSE BLD-MCNC: 280 MG/DL (ref 75–110)
GLUCOSE BLD-MCNC: 282 MG/DL (ref 75–110)
GLUCOSE URINE: ABNORMAL
HCT VFR BLD CALC: 38.5 % (ref 40.7–50.3)
HEMOGLOBIN: 13.4 G/DL (ref 13–17)
IMMATURE GRANULOCYTES: 1 %
KETONES, URINE: ABNORMAL
LEUKOCYTE ESTERASE, URINE: NEGATIVE
LYMPHOCYTES # BLD: 15 % (ref 24–43)
Lab: ABNORMAL
MAGNESIUM: 1.6 MG/DL (ref 1.6–2.6)
MCH RBC QN AUTO: 29.1 PG (ref 25.2–33.5)
MCHC RBC AUTO-ENTMCNC: 34.8 G/DL (ref 28.4–34.8)
MCV RBC AUTO: 83.7 FL (ref 82.6–102.9)
MONOCYTES # BLD: 12 % (ref 3–12)
MUCUS: ABNORMAL
NITRITE, URINE: NEGATIVE
NRBC AUTOMATED: 0 PER 100 WBC
OTHER OBSERVATIONS UA: ABNORMAL
PDW BLD-RTO: 11.6 % (ref 11.8–14.4)
PH UA: 7.5 (ref 5–8)
PLATELET # BLD: 299 K/UL (ref 138–453)
PLATELET ESTIMATE: ABNORMAL
PMV BLD AUTO: 9.7 FL (ref 8.1–13.5)
POTASSIUM SERPL-SCNC: 3.6 MMOL/L (ref 3.7–5.3)
PROTEIN UA: NEGATIVE
RBC # BLD: 4.6 M/UL (ref 4.21–5.77)
RBC # BLD: ABNORMAL 10*6/UL
RBC UA: ABNORMAL /HPF (ref 0–2)
RENAL EPITHELIAL, UA: ABNORMAL /HPF
SEG NEUTROPHILS: 71 % (ref 36–65)
SEGMENTED NEUTROPHILS ABSOLUTE COUNT: 9.91 K/UL (ref 1.5–8.1)
SODIUM BLD-SCNC: 130 MMOL/L (ref 135–144)
SPECIFIC GRAVITY UA: 1.02 (ref 1–1.03)
SPECIMEN DESCRIPTION: ABNORMAL
TRICHOMONAS: ABNORMAL
TURBIDITY: ABNORMAL
URINE HGB: NEGATIVE
UROBILINOGEN, URINE: NORMAL
WBC # BLD: 14.1 K/UL (ref 3.5–11.3)
WBC # BLD: ABNORMAL 10*3/UL
WBC UA: ABNORMAL /HPF (ref 0–5)
YEAST: ABNORMAL

## 2020-12-30 PROCEDURE — 6370000000 HC RX 637 (ALT 250 FOR IP): Performed by: STUDENT IN AN ORGANIZED HEALTH CARE EDUCATION/TRAINING PROGRAM

## 2020-12-30 PROCEDURE — 99232 SBSQ HOSP IP/OBS MODERATE 35: CPT | Performed by: INTERNAL MEDICINE

## 2020-12-30 PROCEDURE — 97110 THERAPEUTIC EXERCISES: CPT

## 2020-12-30 PROCEDURE — 6370000000 HC RX 637 (ALT 250 FOR IP): Performed by: NEUROLOGICAL SURGERY

## 2020-12-30 PROCEDURE — 6360000002 HC RX W HCPCS: Performed by: NURSE PRACTITIONER

## 2020-12-30 PROCEDURE — 99233 SBSQ HOSP IP/OBS HIGH 50: CPT | Performed by: INTERNAL MEDICINE

## 2020-12-30 PROCEDURE — 6360000002 HC RX W HCPCS: Performed by: NEUROLOGICAL SURGERY

## 2020-12-30 PROCEDURE — 1200000000 HC SEMI PRIVATE

## 2020-12-30 PROCEDURE — 81001 URINALYSIS AUTO W/SCOPE: CPT

## 2020-12-30 PROCEDURE — 2580000003 HC RX 258: Performed by: NEUROLOGICAL SURGERY

## 2020-12-30 PROCEDURE — 36415 COLL VENOUS BLD VENIPUNCTURE: CPT

## 2020-12-30 PROCEDURE — 83735 ASSAY OF MAGNESIUM: CPT

## 2020-12-30 PROCEDURE — 6360000002 HC RX W HCPCS: Performed by: INTERNAL MEDICINE

## 2020-12-30 PROCEDURE — 97530 THERAPEUTIC ACTIVITIES: CPT

## 2020-12-30 PROCEDURE — 6370000000 HC RX 637 (ALT 250 FOR IP): Performed by: NURSE PRACTITIONER

## 2020-12-30 PROCEDURE — 2580000003 HC RX 258: Performed by: INTERNAL MEDICINE

## 2020-12-30 PROCEDURE — APPNB15 APP NON BILLABLE TIME 0-15 MINS: Performed by: NURSE PRACTITIONER

## 2020-12-30 PROCEDURE — 82947 ASSAY GLUCOSE BLOOD QUANT: CPT

## 2020-12-30 PROCEDURE — 80048 BASIC METABOLIC PNL TOTAL CA: CPT

## 2020-12-30 PROCEDURE — 97116 GAIT TRAINING THERAPY: CPT

## 2020-12-30 PROCEDURE — 85025 COMPLETE CBC W/AUTO DIFF WBC: CPT

## 2020-12-30 RX ORDER — INSULIN GLARGINE 100 [IU]/ML
25 INJECTION, SOLUTION SUBCUTANEOUS NIGHTLY
Status: DISCONTINUED | OUTPATIENT
Start: 2020-12-30 | End: 2020-12-30

## 2020-12-30 RX ORDER — PANTOPRAZOLE SODIUM 40 MG/1
40 TABLET, DELAYED RELEASE ORAL
Status: DISCONTINUED | OUTPATIENT
Start: 2020-12-30 | End: 2020-12-31 | Stop reason: HOSPADM

## 2020-12-30 RX ORDER — INSULIN GLARGINE 100 [IU]/ML
20 INJECTION, SOLUTION SUBCUTANEOUS NIGHTLY
Status: DISCONTINUED | OUTPATIENT
Start: 2020-12-30 | End: 2020-12-31 | Stop reason: HOSPADM

## 2020-12-30 RX ORDER — CALCIUM CARBONATE 200(500)MG
500 TABLET,CHEWABLE ORAL 3 TIMES DAILY PRN
Status: DISCONTINUED | OUTPATIENT
Start: 2020-12-30 | End: 2020-12-31 | Stop reason: HOSPADM

## 2020-12-30 RX ORDER — INSULIN GLARGINE 100 [IU]/ML
10 INJECTION, SOLUTION SUBCUTANEOUS ONCE
Status: COMPLETED | OUTPATIENT
Start: 2020-12-30 | End: 2020-12-30

## 2020-12-30 RX ADMIN — INSULIN LISPRO 2 UNITS: 100 INJECTION, SOLUTION INTRAVENOUS; SUBCUTANEOUS at 20:07

## 2020-12-30 RX ADMIN — Medication 10 ML: at 17:16

## 2020-12-30 RX ADMIN — ENOXAPARIN SODIUM 40 MG: 40 INJECTION SUBCUTANEOUS at 10:18

## 2020-12-30 RX ADMIN — FENTANYL CITRATE 25 MCG: 50 INJECTION, SOLUTION INTRAMUSCULAR; INTRAVENOUS at 22:20

## 2020-12-30 RX ADMIN — OXYCODONE HYDROCHLORIDE AND ACETAMINOPHEN 2 TABLET: 5; 325 TABLET ORAL at 06:39

## 2020-12-30 RX ADMIN — INSULIN LISPRO 3 UNITS: 100 INJECTION, SOLUTION INTRAVENOUS; SUBCUTANEOUS at 13:12

## 2020-12-30 RX ADMIN — INSULIN LISPRO 3 UNITS: 100 INJECTION, SOLUTION INTRAVENOUS; SUBCUTANEOUS at 17:20

## 2020-12-30 RX ADMIN — FENTANYL CITRATE 25 MCG: 50 INJECTION, SOLUTION INTRAMUSCULAR; INTRAVENOUS at 19:46

## 2020-12-30 RX ADMIN — OXYCODONE HYDROCHLORIDE AND ACETAMINOPHEN 2 TABLET: 5; 325 TABLET ORAL at 23:30

## 2020-12-30 RX ADMIN — FENTANYL CITRATE 25 MCG: 50 INJECTION, SOLUTION INTRAMUSCULAR; INTRAVENOUS at 13:19

## 2020-12-30 RX ADMIN — ACETAMINOPHEN 650 MG: 325 TABLET ORAL at 02:08

## 2020-12-30 RX ADMIN — PANTOPRAZOLE SODIUM 40 MG: 40 TABLET, DELAYED RELEASE ORAL at 08:05

## 2020-12-30 RX ADMIN — SODIUM CHLORIDE, PRESERVATIVE FREE 10 ML: 5 INJECTION INTRAVENOUS at 19:53

## 2020-12-30 RX ADMIN — OXYCODONE HYDROCHLORIDE AND ACETAMINOPHEN 2 TABLET: 5; 325 TABLET ORAL at 12:09

## 2020-12-30 RX ADMIN — OXYCODONE HYDROCHLORIDE AND ACETAMINOPHEN 2 TABLET: 5; 325 TABLET ORAL at 18:24

## 2020-12-30 RX ADMIN — INSULIN GLARGINE 10 UNITS: 100 INJECTION, SOLUTION SUBCUTANEOUS at 17:16

## 2020-12-30 RX ADMIN — INSULIN GLARGINE 10 UNITS: 100 INJECTION, SOLUTION SUBCUTANEOUS at 09:19

## 2020-12-30 RX ADMIN — CEFAZOLIN 2 G: 10 INJECTION, POWDER, FOR SOLUTION INTRAVENOUS at 06:44

## 2020-12-30 RX ADMIN — INSULIN GLARGINE 20 UNITS: 100 INJECTION, SOLUTION SUBCUTANEOUS at 20:07

## 2020-12-30 RX ADMIN — Medication 10 ML: at 19:53

## 2020-12-30 RX ADMIN — FENTANYL CITRATE 25 MCG: 50 INJECTION, SOLUTION INTRAMUSCULAR; INTRAVENOUS at 15:12

## 2020-12-30 RX ADMIN — FENTANYL CITRATE 25 MCG: 50 INJECTION, SOLUTION INTRAMUSCULAR; INTRAVENOUS at 01:25

## 2020-12-30 RX ADMIN — INSULIN LISPRO 2 UNITS: 100 INJECTION, SOLUTION INTRAVENOUS; SUBCUTANEOUS at 08:06

## 2020-12-30 RX ADMIN — MAGNESIUM GLUCONATE 500 MG ORAL TABLET 800 MG: 500 TABLET ORAL at 17:15

## 2020-12-30 RX ADMIN — CYCLOBENZAPRINE 10 MG: 10 TABLET, FILM COATED ORAL at 16:52

## 2020-12-30 RX ADMIN — CEFAZOLIN 2 G: 10 INJECTION, POWDER, FOR SOLUTION INTRAVENOUS at 22:15

## 2020-12-30 RX ADMIN — Medication 3 MG: at 23:30

## 2020-12-30 RX ADMIN — FENTANYL CITRATE 25 MCG: 50 INJECTION, SOLUTION INTRAMUSCULAR; INTRAVENOUS at 05:07

## 2020-12-30 RX ADMIN — FENTANYL CITRATE 25 MCG: 50 INJECTION, SOLUTION INTRAMUSCULAR; INTRAVENOUS at 17:16

## 2020-12-30 RX ADMIN — ANTACID TABLETS 500 MG: 500 TABLET, CHEWABLE ORAL at 14:27

## 2020-12-30 RX ADMIN — PANTOPRAZOLE SODIUM 40 MG: 40 TABLET, DELAYED RELEASE ORAL at 17:15

## 2020-12-30 RX ADMIN — CEFAZOLIN 2 G: 10 INJECTION, POWDER, FOR SOLUTION INTRAVENOUS at 14:01

## 2020-12-30 RX ADMIN — INSULIN LISPRO 3 UNITS: 100 INJECTION, SOLUTION INTRAVENOUS; SUBCUTANEOUS at 13:15

## 2020-12-30 RX ADMIN — INSULIN LISPRO 3 UNITS: 100 INJECTION, SOLUTION INTRAVENOUS; SUBCUTANEOUS at 17:15

## 2020-12-30 RX ADMIN — INSULIN LISPRO 3 UNITS: 100 INJECTION, SOLUTION INTRAVENOUS; SUBCUTANEOUS at 08:10

## 2020-12-30 RX ADMIN — SODIUM CHLORIDE, PRESERVATIVE FREE 10 ML: 5 INJECTION INTRAVENOUS at 10:17

## 2020-12-30 RX ADMIN — OXYCODONE HYDROCHLORIDE AND ACETAMINOPHEN 2 TABLET: 5; 325 TABLET ORAL at 00:09

## 2020-12-30 RX ADMIN — Medication 10 ML: at 15:11

## 2020-12-30 RX ADMIN — FENTANYL CITRATE 25 MCG: 50 INJECTION, SOLUTION INTRAMUSCULAR; INTRAVENOUS at 08:06

## 2020-12-30 RX ADMIN — FENTANYL CITRATE 25 MCG: 50 INJECTION, SOLUTION INTRAMUSCULAR; INTRAVENOUS at 10:26

## 2020-12-30 ASSESSMENT — PAIN SCALES - GENERAL
PAINLEVEL_OUTOF10: 8
PAINLEVEL_OUTOF10: 10
PAINLEVEL_OUTOF10: 9
PAINLEVEL_OUTOF10: 8
PAINLEVEL_OUTOF10: 7
PAINLEVEL_OUTOF10: 7
PAINLEVEL_OUTOF10: 6
PAINLEVEL_OUTOF10: 7
PAINLEVEL_OUTOF10: 9

## 2020-12-30 ASSESSMENT — PAIN DESCRIPTION - PROGRESSION
CLINICAL_PROGRESSION: NOT CHANGED
CLINICAL_PROGRESSION: NOT CHANGED

## 2020-12-30 ASSESSMENT — PAIN DESCRIPTION - ORIENTATION
ORIENTATION: LOWER
ORIENTATION: LOWER

## 2020-12-30 ASSESSMENT — PAIN DESCRIPTION - FREQUENCY
FREQUENCY: CONTINUOUS
FREQUENCY: CONTINUOUS

## 2020-12-30 ASSESSMENT — PAIN DESCRIPTION - LOCATION
LOCATION: BACK
LOCATION: BACK

## 2020-12-30 NOTE — PROGRESS NOTES
The patient had blood cultures obtained and he has been placed on Zosyn and vancomycin pending further data. At [address]. Karthik's he is being continued on vancomycin on place on cefepime. He has a prior history of MRSA. His Covid test was negative on 12/27/2020. CURRENT EVALUATION : 12/30/2020    Afebrile  VS stable    The patient seen and evaluated at bedside. Patient is better with no new acute issues or concerns today. Patient does not have any fevers, chills, cough, shortness of breath, chest pains or palpitations. Culture from T9-11 laminectomy done 12/29/2020: S aureus  Blood cultures: MSSA     Incisional pain better. Drain is out  Site healing well. Discussed with discharge planner. Best option for him is to receive cefazolin at a facility  He could be treated with ceftriaxone 2  Gm IV daily as an alternative option  Treatment with once a week Dalbavancin or Oritavancin puts him at risk of developing osteomyelitis of the thoracic spine. Labs, X rays reviewed: 12/30/2020    BUN: 14-->16  Cr:0.57-->0.51    WBC: 16.6-->16.3  Hb: 13.4-->12.9  Plat: 312-->300    Cultures:  Urine:  · 12-27-20 pending  Blood:  · 12-27-20 pending  · 12-29-20 MSSA  Sputum :  ·   Wound:  · 12-27-20 : lt flank swab,  s aureus heavy growth  · 12-29-20 drainage from laminectomy: gram + cocci in clusters  · 12-30-30 drainage from laminectomy: S. aureus      I have personally reviewed the past medical history, past surgical history, medications, social history, and family history, and I have updated the database accordingly.   Past Medical History:     Past Medical History:   Diagnosis Date    Type 1 diabetes mellitus ([address]     dx 2016       Past Surgical  History:     Past Surgical History:   Procedure Laterality Date    FOOT DEBRIDEMENT Right     2018    LAMINECTOMY POSTERIOR THORACIC / LUMBAR N/A 12/28/2020 POSTERIOR THORACIC LAMINECTOMY  T10-T12, EVACUATION OF ABSCESS performed by Anabela Desai MD at [address] [place] [address] Drive  12/28/2020    POSTERIOR THORACIC LAMINECTOMY  T10-T12, EVACUATION OF ABSCESS        Medications:      insulin glargine  20 Units Subcutaneous Nightly    enoxaparin  40 mg Subcutaneous Daily    senna  1 tablet Oral Nightly    polyethylene glycol  17 g Oral Daily    docusate sodium  100 mg Oral Daily    insulin lispro  3 Units Subcutaneous TID WC    ceFAZolin  2 g Intravenous Q8H    melatonin  5 mg Oral Once    insulin lispro  0-6 Units Subcutaneous TID WC    insulin lispro  0-3 Units Subcutaneous Nightly    sodium chloride flush  10 mL Intravenous 2 times per day    sodium chloride flush  10 mL Intravenous 2 times per day    sodium chloride  20 mL Intravenous Once    pantoprazole  40 mg Oral QAM AC       Social History:     Social History     Socioeconomic History    Marital status: Unknown     Spouse name: Not on file    Number of children: Not on file    Years of education: Not on file    Highest education level: Not on file   Occupational History    Not on file   Social Needs    Financial resource strain: Not on file    Food insecurity     Worry: Not on file     Inability: Not on file    Transportation needs     Medical: Not on file     Non-medical: Not on file   Tobacco Use    Smoking status: Current Every Day Smoker     Packs/day: 0.50     Years: 20.00     Pack years: 10.00    Tobacco comment: 1/2 pack per day   Substance and Sexual Activity    Alcohol use: Never     Frequency: Never    Drug use: Yes     Comment: Heroin use in 2006    Sexual activity: Not on file   Lifestyle    Physical activity     Days per week: Not on file     Minutes per session: Not on file    Stress: Not on file   Relationships    Social connections     Talks on phone: Not on file     Gets together: Not on file     Attends Jew service: Not on file Active member of club or organization: Not on file     Attends meetings of clubs or organizations: Not on file     Relationship status: Not on file    Intimate partner violence     Fear of current or ex partner: Not on file     Emotionally abused: Not on file     Physically abused: Not on file     Forced sexual activity: Not on file   Other Topics Concern    Not on file   Social History Narrative    Not on file       Family History:   No family history on file. Allergies:   Bactrim [sulfamethoxazole-trimethoprim] and Toradol [ketorolac tromethamine]     Review of Systems:   Constitutional: No fevers or chills. No systemic complaints  Head: No headaches  Eyes: No double vision or blurry vision. No conjunctival inflammation. ENT: No sore throat or runny nose. . No hearing loss, tinnitus or vertigo. Cardiovascular: No chest pain or palpitations. No shortness of breath. No ALMEIDA  Lung: No shortness of breath or cough. No sputum production  Abdomen: No nausea, vomiting, diarrhea, or abdominal pain. Jess  No cramps. Genitourinary: No increased urinary frequency, or dysuria. No hematuria. No suprapubic or CVA pain  Musculoskeletal: No muscle aches or pains. No joint effusions, swelling or deformities. Back pain in the thoracic area - improved since yesterday. Hematologic: No bleeding or bruising. Neurologic: No headache, weakness, numbness, or tingling. Integument: No rash, no ulcers. Psychiatric: No depression. Endocrine: No polyuria, no polydipsia, no polyphagia.     Physical Examination :     Patient Vitals for the past 8 hrs:   BP Temp Temp src Pulse Resp SpO2   12/30/20 0745 127/82 98.8 °F (37.1 °C) Oral 62 21 96 %   12/30/20 0516 126/78 97.8 °F (36.6 °C)  54 20 97 %     General Appearance: Awake, alert, and in no apparent distress  Head:  Normocephalic, no trauma Eyes: Pupils equal, round, reactive to light and accommodation; extraocular movements intact; sclera anicteric; conjunctivae pink. No embolic phenomena. ENT: Oropharynx clear, without erythema, exudate, or thrush. No tenderness of sinuses. Mouth/throat: mucosa pink and moist. No lesions. Dentition in good repair. Neck:Supple, without lymphadenopathy. Thyroid normal, No bruits. Pulmonary/Chest: Clear to auscultation, without wheezes, rales, or rhonchi. No dullness to percussion. Cardiovascular: Regular rate and rhythm without murmurs, rubs, or gallops. Abdomen: Soft, non tender. Bowel sounds normal. No organomegaly  All four Extremities: No cyanosis, clubbing, edema, or effusions. Neurologic: No gross sensory or motor deficits. Skin: Warm and dry with good turgor. No signs of peripheral arterial or venous insufficiency. No ulcerations. Surgical site on back appears to be healing well. Medical Decision Making -Laboratory:   I have independently reviewed/ordered the following labs:    CBC with Differential:   Recent Labs     12/28/20 0621 12/29/20  0919   WBC 16.3* 16.3*   HGB 13.2 12.9*   HCT 39.1* 39.3*    300   LYMPHOPCT 7* 5*   MONOPCT 12* 13*     BMP:   Recent Labs     12/28/20 0621 12/29/20  0919   * 129*   K 3.6* 3.8   CL 95* 94*   CO2 22 18*   BUN 15 16   CREATININE 0.61* 0.51*     Hepatic Function Panel:   No results for input(s): PROT, LABALBU, BILIDIR, IBILI, BILITOT, ALKPHOS, ALT, AST in the last 72 hours. No results for input(s): RPR in the last 72 hours. No results for input(s): HIV in the last 72 hours. No results for input(s): BC in the last 72 hours.   Lab Results   Component Value Date    MUCUS NOT REPORTED 12/27/2020    RBC 4.42 12/29/2020    TRICHOMONAS NOT REPORTED 12/27/2020    WBC 16.3 12/29/2020    YEAST NOT REPORTED 12/27/2020    TURBIDITY CLEAR 12/27/2020     Lab Results   Component Value Date    CREATININE 0.51 12/29/2020    GLUCOSE 277 12/29/2020 Medical Decision Making-Imaging:       Medical Decision Mssokq-Lphdzbde-Fsgau:       Medical Decision Making-Other:     Note:  · Labs, medications, radiologic studies were reviewed with personal review of films  · Large amounts of data were reviewed  · Discussed with nursing Staff, Discharge planner  · Infection Control and Prevention measures reviewed  · All prior entries were reviewed  · Administer medications as ordered  · Prognosis: Fair  · Discharge planning reviewed  · Follow up as outpatient. Thank you for allowing us to participate in the care of this patient. Please call with questions. Gabriela Hart-[email or web address]    ATTESTATION:    I have discussed the case, including pertinent history and exam findings with the residents and students. I have seen and examined the patient and the key elements of the encounter have been performed by me. I was present when the student obtained his information or examined the patient. I have reviewed the laboratory data, other diagnostic studies and discussed them with the residents. I have updated the medical record where necessary. I agree with the assessment, plan and orders as documented by the resident/ student.     Sulema August MD.

## 2020-12-30 NOTE — PROGRESS NOTES
[facility] - Progress Note    Today's Date and Time: 12/29/2020, 7:30 PM    Impression :   · History of IV drug abuse  · Thoracic epidural abscess with possible cord compression at T10 T12 level  · Back pain  · S aureus septicemia 12-27-20    Recommendations:   · Dicontinue vancomycin and cefepime  · Cefazolin 2 gm IV q 8 hr. Stop date 1-27-21    Medical Decision Making/Summary/Discussion:12/29/2020     ·   Infection Control Recommendations   · [place] Precautions  · Contact Isolation MRSA    Antimicrobial Stewardship Recommendations     · Simplification of therapy  · Targeted therapy    Coordination of Outpatient Care:   · Estimated Length of IV antimicrobials: TBD  · Patient will need Midline Catheter Insertion: TBD  · Patient will need PICC line Insertion:TBD  · Patient will need: Home IV , [place],  SNF,  LTAC:TBD  · Patient will need outpatient wound care:TBD    Chief complaint/reason for consultation:   · Epidural abscess of the thoracic area      History of Present Illness:   Bruce Estrella is a 38y.o.-year-old male who was initially admitted on 12/27/2020. Patient seen at the request of Gordy Campos. INITIAL HISTORY:    Patient was transferred from [facility] because of the presence of an epidural abscess. The patient has had past history of IV drug abuse. He had developed back pain over the last few days and presented to [facility]. A CT scan at [facility] show the presence of an epidural abscess with possible cord compression noted at T9 T11 level. Patient was transferred to [facility] for neurosurgical care. The patient denied any fevers, chills, night sweats. He indicated the presence of severe back pain. He denied any loss of function of the arms or legs. He denied any sphincter dysfunction. Patient has been evaluated by the neurosurgical service will plan to do a decompressive procedure on 12/28/2020. The patient had blood cultures obtained and he has been placed on Zosyn and vancomycin pending further data. At [facility]. Karthik's he is being continued on vancomycin on place on cefepime. He has a prior history of MRSA. His Covid test was negative on 12/27/2020. CURRENT EVALUATION : 12/29/2020    Afebrile  VS stable    T9-11 laminectomy done 12/29/2020, cultures from the surgical drainage showed gram + cocci in clusters. Blood cultures showed MSSA. Pt states back pain over the site of the incision, but the pain is much improved since yesterday. Denies chest pain, shortness of breath, nausea, vomiting, diarrhea. Discussed management with patient and family. Indicated need for 4 weeks treatment for MSSA septicemia. Labs, X rays reviewed: 12/29/2020    BUN: 14  Cr:0.57    WBC: 16.6  Hb: 13.4  Plat: 312    Cultures:  Urine:  · 12-27-20 pending  Blood:  · 12-27-20 pending  · 12-29-20 MSSA  Sputum :  ·   Wound:  · 12-27-20 : lt flank swab,  s aureus heavy growth  · 12-29-20 drainage from laminectomy: gram + cocci in clusters      I have personally reviewed the past medical history, past surgical history, medications, social history, and family history, and I have updated the database accordingly.   Past Medical History:     Past Medical History:   Diagnosis Date    Type 1 diabetes mellitus ([facility] [address]     dx 2016       Past Surgical  History:     Past Surgical History:   Procedure Laterality Date    FOOT DEBRIDEMENT Right     2018    LAMINECTOMY POSTERIOR THORACIC / LUMBAR N/A 12/28/2020    POSTERIOR THORACIC LAMINECTOMY  T10-T12, EVACUATION OF ABSCESS performed by Steve Kelly MD at  [address]  12/28/2020    POSTERIOR THORACIC LAMINECTOMY  T10-T12, EVACUATION OF ABSCESS        Medications:      senna  1 tablet Oral Nightly    polyethylene glycol  17 g Oral Daily    docusate sodium  100 mg Oral Daily    insulin glargine  20 Units Subcutaneous Daily No family history on file. Allergies:   Bactrim [sulfamethoxazole-trimethoprim] and Toradol [ketorolac tromethamine]     Review of Systems:   Constitutional: No fevers or chills. No systemic complaints  Head: No headaches  Eyes: No double vision or blurry vision. No conjunctival inflammation. ENT: No sore throat or runny nose. . No hearing loss, tinnitus or vertigo. Cardiovascular: No chest pain or palpitations. No shortness of breath. No ALMEIDA  Lung: No shortness of breath or cough. No sputum production  Abdomen: No nausea, vomiting, diarrhea, or abdominal pain. Debbie Severance No cramps. Genitourinary: No increased urinary frequency, or dysuria. No hematuria. No suprapubic or CVA pain  Musculoskeletal: No muscle aches or pains. No joint effusions, swelling or deformities. Back pain in the thoracic area - improved since yesterday. Hematologic: No bleeding or bruising. Neurologic: No headache, weakness, numbness, or tingling. Integument: No rash, no ulcers. Psychiatric: No depression. Endocrine: No polyuria, no polydipsia, no polyphagia. Physical Examination :     Patient Vitals for the past 8 hrs:   BP Temp Pulse Resp SpO2   12/29/20 1615 130/80 97.9 °F (36.6 °C) 62 22 97 %   12/29/20 1143  98.7 °F (37.1 °C)        General Appearance: Awake, alert, and in no apparent distress  Head:  Normocephalic, no trauma  Eyes: Pupils equal, round, reactive to light and accommodation; extraocular movements intact; sclera anicteric; conjunctivae pink. No embolic phenomena. ENT: Oropharynx clear, without erythema, exudate, or thrush. No tenderness of sinuses. Mouth/throat: mucosa pink and moist. No lesions. Dentition in good repair. Neck:Supple, without lymphadenopathy. Thyroid normal, No bruits. Pulmonary/Chest: Clear to auscultation, without wheezes, rales, or rhonchi. No dullness to percussion. Cardiovascular: Regular rate and rhythm without murmurs, rubs, or gallops. Thank you for allowing us to participate in the care of this patient. Please call with questions.     Tyrone Beverly MD

## 2020-12-30 NOTE — CARE COORDINATION
Called Chelita from [facility], she will look over referral and let me know if he is LTACH appropriate. Will discuss with patient    Discussed with patient that he will be needing long term antibiotics and mentioned an LTACH, explained what it was and how it differs from SNF. He appeared to be receptive, then questioned going home and getting antibiotics at home. He tells me his girlfriend is a nurse and can do the iv at home. Will need to discuss with ID the plan for abx.

## 2020-12-30 NOTE — PROGRESS NOTES
Neurosurgery BENIGNO/Resident    Daily Progress Note   CC:  Chief Complaint   Patient presents with    Back Pain     From [facility], epidural abscess with cord compression at T9-T11. Received 4.5 g Zosyn, 150 mg Ketamine, 2 mg Dilaudid, 8 mg Versed, and Vanco started PTA     12/30/2020  9:17 AM    Chart reviewed. No acute events overnight. No new complaints. Doing well this morning, up walking with PT. Tolerating diet and pain is well controlled. KHALIF with minimal output. Afebrile. Denies chest pain, shortness of breath, nausea and vomiting.     Vitals:    12/29/20 1950 12/29/20 2350 12/30/20 0516 12/30/20 0745   BP: 125/77 107/70 126/78 127/82   Pulse: 65 79 54 62   Resp: 24 18 20 21   Temp: 97.8 °F (36.6 °C) 98.1 °F (36.7 °C) 97.8 °F (36.6 °C) 98.8 °F (37.1 °C)   TempSrc: Oral Oral  Oral   SpO2: 97% 95% 97% 96%   Weight:       Height:           PE:   AOx3   Motor   L iliopsoas 5/5 , R iliopsoas 5/5  L quadriceps 5/5; R quadriceps 5/5  L Dorsiflexion 5/5; R dorsiflexion 5/5  L Plantarflexion 5/5; R plantarflexion 5/5  L EHL 5/5; R EHL 5/5    Sensation: intact     Drain output: KHALIF with 20ml/12 hours   Incision: clean dry intact closed with staples no drainage noted       Lab Results   Component Value Date    WBC 16.3 (H) 12/29/2020    HGB 12.9 (L) 12/29/2020    HCT 39.3 (L) 12/29/2020     12/29/2020    ALT 25 12/27/2020    AST 17 12/27/2020     (L) 12/29/2020    K 3.8 12/29/2020    CL 94 (L) 12/29/2020    CREATININE 0.51 (L) 12/29/2020    BUN 16 12/29/2020    CO2 18 (L) 12/29/2020    INR 0.9 12/27/2020    LABA1C 12.5 (H) 12/27/2020    CRP 85.4 (H) 12/27/2020       A/P  38 y.o. male who presents with back pain and weakness, thoracici epidural abscess  POD #2 s/p T1-0 laminectomy and evacuation of epidural abscess     - PT and OT for mobilization  - ID following patient will need IV antibiotics till 1/27 - will consult urology as patient will need to follow up with them outpatient, I did speak with Savannah Cook regarding this patient   - ok to start lovenox from neurosurgery standpoint   - pain well controlled  - tolerating diet well  - encourage use of incentive spirometer   - ok to be discharged from a neurosurgery standpoint     Drain Removal Note    []Subdural Drain   []Subgaleal Drain  [x] closed suction back bulb Drain    Drain removed from suction, prepped with betadine and sterile towels placed to create sterile field. Drain suture cut and drain removed. A single figure 8 suture placed with 3.0 nylon over drain hole with hemostasis. No complications, patient tolerated procedure well. Please contact neurosurgery with any changes in patients neurologic status.        Denise Ferrer CNP  12/30/20  9:17 AM

## 2020-12-30 NOTE — PROGRESS NOTES
[facility]  Internal Medicine Teaching Residency Program  Inpatient Daily Progress Note  ______________________________________________________________________________    Patient: Clearance Cradle  YOB: 1985   IKQ:[id number]    Acct: [de-identified]     Room: [id number]  Admit date: 12/27/2020  Today's date: 12/30/20  Number of days in the hospital: 3    SUBJECTIVE   Admitting Diagnosis: Epidural abscess  CC: Low Back Pain   Pt examined at bedside. Chart & results reviewed. No acute episodes overnight  Patient is heme stable and afebrile  Patient had a run of V-tach this morning   Denies any chest pain  Potassium and Magnesium replaced  Patient is not receptive to [facility] and wants to go home   Requires IV antibiotics till 1/27/2021   helping with placement     ROS:  Constitutional:  negative for chills, fevers, sweats  Respiratory:  negative for cough, dyspnea on exertion, hemoptysis, shortness of breath, wheezing  Cardiovascular:  negative for chest pain, chest pressure/discomfort, lower extremity edema, palpitations  Gastrointestinal:  negative for abdominal pain, constipation, diarrhea, nausea, vomiting  Neurological:  negative for dizziness, headache    BRIEF HISTORY     The patient is a pleasant 35 y. o. male PMH significant for diabetes mellitus type 2, IV drug use presents with a chief complaint of lower back pain for past several days.     According to the patient, he started having lower back pain 3 to 4 days back, but not associated with any fevers, chills, numbness, tingling, weakness of bilateral lower legs.  He initially went to [facility] ED, was transferred to [facility] upon MRI with contrast finding of T10-T12 spinal epidural fluid collection. [facility] denied any saddle anesthesia, urinary or fecal incontinence.  Patient states that he last used IV drugs in 2014.     In the ED, patient was hypertensive /87, HR 68, saturating 99% on room air.  Labs were unremarkable except for hyperglycemia blood glucose 301, elevated WBC count 16.6, elevated procalcitonin 0.28, elevated CRP 85.4.  Hemoglobin A1c 12.5. ESR 64.     Wound culture ordered.  Direct exam showed many gram-positive cocci in clusters. On physical exam, patient has strength 5/5 in all 4 extremities, sensation intact, cranial nerves grossly intact. OBJECTIVE     Vital Signs:  /64   Pulse 79   Temp 98.2 °F (36.8 °C) (Oral)   Resp 19   Ht 5' 8\" (1.727 m)   Wt 169 lb (76.7 kg)   SpO2 93%   BMI 25.70 kg/m²     Temp (24hrs), Av.1 °F (36.7 °C), Min:97.8 °F (36.6 °C), Max:98.8 °F (37.1 °C)    In: -   Out: 2770 [Urine:2750; Drains:20]    Physical Exam:  Constitutional: This is a well developed, well nourished 38y.o. year old male who is alert, oriented, cooperative and in no apparent distress. Respiratory: Bilateral breath sounds clear to auscultation. No wheezes or crackles  Cardiovascular: Regular without murmur, clicks, gallops or rubs. Abdomen: Soft, nontender. No distention  Extremities:  No lower extremity edema. Spinal dressing normal. Lower back discomfort.    Skin:  Warm and dry  Neurological/Psychiatric: The patient's general behavior, level of consciousness, thought content and emotional status is normal.           Medications:  Scheduled Medications:    insulin glargine  20 Units Subcutaneous Nightly    enoxaparin  40 mg Subcutaneous Daily    pantoprazole  40 mg Oral BID AC    senna  1 tablet Oral Nightly    polyethylene glycol  17 g Oral Daily    docusate sodium  100 mg Oral Daily    insulin lispro  3 Units Subcutaneous TID WC    ceFAZolin  2 g Intravenous Q8H    melatonin  5 mg Oral Once    insulin lispro  0-6 Units Subcutaneous TID WC    insulin lispro  0-3 Units Subcutaneous Nightly    sodium chloride flush  10 mL Intravenous 2 times per day -LISA-65 antibody pending   -Lantus 25 units daily and lispro 3 units TID AC. -Will continue to monitor    IV Drug Use  -Counseled       DVT ppx : Lovenox  GI ppx: Protonix     PT/OT/SW: On board  Discharge Planning: LTACH placement pending     Jerri Lockhart MD  Internal Medicine Resident, PGY-1  [facility]; [place], [place]  12/30/2020, 3:15 PM      Attending Physician Statement  I have discussed the case, including pertinent history and exam findings with the resident and the team.  I have seen and examined the patient and the key elements of the encounter have been performed by me. I agree with the assessment, plan and orders as documented by the resident.       In Brief:  Doing well  Vital stable  No back pain  Appreciate ID input - Ancef 2 grams q 8 hrs, stop date 1/27/20    Bre Deutsch MD   Attending Physician, Internal Medicine Residency Program  12/30/2020, 3:35 PM

## 2020-12-30 NOTE — PROGRESS NOTES
[facility] - Progress Note    Today's Date and Time: 12/30/2020, 1:48 PM    Impression :   · History of IV drug abuse  · Thoracic epidural abscess with possible cord compression at T10 T12 level  · Back pain  · S aureus septicemia 12-27-20    Recommendations:   · Cefazolin 2 gm IV q 8 hr. Stop date 1-27-21    Medical Decision Making/Summary/Discussion:12/30/2020     ·   Infection Control Recommendations   · [place] Precautions  · Contact Isolation MRSA    Antimicrobial Stewardship Recommendations     · Simplification of therapy  · Targeted therapy    Coordination of Outpatient Care:   · Estimated Length of IV antimicrobials: TBD  · Patient will need Midline Catheter Insertion: TBD  · Patient will need PICC line Insertion:TBD  · Patient will need: Home IV , [place],  SNF,  LTAC:TBD  · Patient will need outpatient wound care:TBD    Chief complaint/reason for consultation:   · Epidural abscess of the thoracic area      History of Present Illness:   He Guallpa is a 38y.o.-year-old male who was initially admitted on 12/27/2020. Patient seen at the request of Neo Hankins. INITIAL HISTORY:    Patient was transferred from [facility] because of the presence of an epidural abscess. The patient has had past history of IV drug abuse. He had developed back pain over the last few days and presented to [facility]. A CT scan at [facility] show the presence of an epidural abscess with possible cord compression noted at T9 T11 level. Patient was transferred to [facility] for neurosurgical care. The patient denied any fevers, chills, night sweats. He indicated the presence of severe back pain. He denied any loss of function of the arms or legs. He denied any sphincter dysfunction. Patient has been evaluated by the neurosurgical service will plan to do a decompressive procedure on 12/28/2020. The patient had blood cultures obtained and he has been placed on Zosyn and vancomycin pending further data. At [facility]. Karthik[facility] he is being continued on vancomycin on place on cefepime. He has a prior history of MRSA. His Covid test was negative on 12/27/2020. CURRENT EVALUATION : 12/30/2020    Afebrile  VS stable    The patient seen and evaluated at bedside. Patient is better with no new acute issues or concerns today. Patient does not have any fevers, chills, cough, shortness of breath, chest pains or palpitations. Culture from T9-11 laminectomy done 12/29/2020: S aureus  Blood cultures: MSSA     Incisional pain better. Drain is out  Site healing well. Discussed with discharge planner. Best option for him is to receive cefazolin at a facility  He could be treated with ceftriaxone 2  Gm IV daily as an alternative option  Treatment with once a week Dalbavancin or Oritavancin puts him at risk of developing osteomyelitis of the thoracic spine. Labs, X rays reviewed: 12/30/2020    BUN: 14-->16  Cr:0.57-->0.51    WBC: 16.6-->16.3  Hb: 13.4-->12.9  Plat: 312-->300    Cultures:  Urine:  · 12-27-20 pending  Blood:  · 12-27-20 pending  · 12-29-20 MSSA  Sputum :  ·   Wound:  · 12-27-20 : lt flank swab,  s aureus heavy growth  · 12-29-20 drainage from laminectomy: gram + cocci in clusters  · 12-30-30 drainage from laminectomy: S. aureus      I have personally reviewed the past medical history, past surgical history, medications, social history, and family history, and I have updated the database accordingly.   Past Medical History:     Past Medical History:   Diagnosis Date    Type 1 diabetes mellitus ([address]     dx 2016       Past Surgical  History:     Past Surgical History:   Procedure Laterality Date    FOOT DEBRIDEMENT Right     2018    LAMINECTOMY POSTERIOR THORACIC / LUMBAR N/A 12/28/2020 Active member of club or organization: Not on file     Attends meetings of clubs or organizations: Not on file     Relationship status: Not on file    Intimate partner violence     Fear of current or ex partner: Not on file     Emotionally abused: Not on file     Physically abused: Not on file     Forced sexual activity: Not on file   Other Topics Concern    Not on file   Social History Narrative    Not on file       Family History:   No family history on file. Allergies:   Bactrim [sulfamethoxazole-trimethoprim] and Toradol [ketorolac tromethamine]     Review of Systems:   Constitutional: No fevers or chills. No systemic complaints  Head: No headaches  Eyes: No double vision or blurry vision. No conjunctival inflammation. ENT: No sore throat or runny nose. . No hearing loss, tinnitus or vertigo. Cardiovascular: No chest pain or palpitations. No shortness of breath. No ALMEIDA  Lung: No shortness of breath or cough. No sputum production  Abdomen: No nausea, vomiting, diarrhea, or abdominal pain. Peder Woody No cramps. Genitourinary: No increased urinary frequency, or dysuria. No hematuria. No suprapubic or CVA pain  Musculoskeletal: No muscle aches or pains. No joint effusions, swelling or deformities. Back pain in the thoracic area - improved since yesterday. Hematologic: No bleeding or bruising. Neurologic: No headache, weakness, numbness, or tingling. Integument: No rash, no ulcers. Psychiatric: No depression. Endocrine: No polyuria, no polydipsia, no polyphagia.     Physical Examination :     Patient Vitals for the past 8 hrs:   BP Temp Temp src Pulse Resp SpO2   12/30/20 1245 112/64 98.2 °F (36.8 °C) Oral 79 19 93 %   12/30/20 0745 127/82 98.8 °F (37.1 °C) Oral 62 21 96 %     General Appearance: Awake, alert, and in no apparent distress  Head:  Normocephalic, no trauma Eyes: Pupils equal, round, reactive to light and accommodation; extraocular movements intact; sclera anicteric; conjunctivae pink. No embolic phenomena. ENT: Oropharynx clear, without erythema, exudate, or thrush. No tenderness of sinuses. Mouth/throat: mucosa pink and moist. No lesions. Dentition in good repair. Neck:Supple, without lymphadenopathy. Thyroid normal, No bruits. Pulmonary/Chest: Clear to auscultation, without wheezes, rales, or rhonchi. No dullness to percussion. Cardiovascular: Regular rate and rhythm without murmurs, rubs, or gallops. Abdomen: Soft, non tender. Bowel sounds normal. No organomegaly  All four Extremities: No cyanosis, clubbing, edema, or effusions. Neurologic: No gross sensory or motor deficits. Skin: Warm and dry with good turgor. No signs of peripheral arterial or venous insufficiency. No ulcerations. Surgical site on back appears to be healing well. Medical Decision Making -Laboratory:   I have independently reviewed/ordered the following labs:    CBC with Differential:   Recent Labs     12/29/20  0919 12/30/20  1232   WBC 16.3* 14.1*   HGB 12.9* 13.4   HCT 39.3* 38.5*    299   LYMPHOPCT 5* 15*   MONOPCT 13* 12     BMP:   Recent Labs     12/29/20  0919 12/30/20  1232   * 130*   K 3.8 3.6*   CL 94* 94*   CO2 18* 24   BUN 16 13   CREATININE 0.51* 0.56*   MG  --  1.6     Hepatic Function Panel:   No results for input(s): PROT, LABALBU, BILIDIR, IBILI, BILITOT, ALKPHOS, ALT, AST in the last 72 hours. No results for input(s): RPR in the last 72 hours. No results for input(s): HIV in the last 72 hours. No results for input(s): BC in the last 72 hours.   Lab Results   Component Value Date    MUCUS NOT REPORTED 12/30/2020    RBC 4.60 12/30/2020    TRICHOMONAS NOT REPORTED 12/30/2020    WBC 14.1 12/30/2020    YEAST NOT REPORTED 12/30/2020    TURBIDITY TURBID 12/30/2020     Lab Results   Component Value Date    CREATININE 0.56 12/30/2020    GLUCOSE 274 12/30/2020

## 2020-12-30 NOTE — FLOWSHEET NOTE
12/29/20 2208   Encounter Summary   Services provided to: Patient and family together   Referral/Consult From: Nurse   Support System Significant other;Parent   Continue Visiting   (12/29/2020)   Complexity of Encounter Moderate   Length of Encounter 1 hour   Spiritual Assessment Completed Yes   Routine   Type Initial   Assessment Calm; Approachable   Intervention Active listening;Explored feelings, thoughts, concerns;Explored coping resources;Nurtured hope;Prayer;Sustaining presence/ Ministry of presence   Outcome Comfort     SPIRITUAL CARE DEPARTMENT - [facility] [address]  PROGRESS NOTE    Shift date: 12/29/2020  Shift day: Saturday   Shift # 2    Room # 4170/2711-72   Name: Margot Valera            Age: 38 y.o. Gender: male          Orthodox:    Place of Buddhism:     Referral: Routine Visit    Admit Date & Time: 12/27/2020  7:38 AM    PATIENT/EVENT DESCRIPTION:  Margot Valera is a 38 y.o. male who transferred to [address] [place] [address] Route  from [facility] with spinal issues. Pt. Is found in bed with mother at bedside. Pt. Talkative and reports feeling better. SPIRITUAL ASSESSMENT/INTERVENTION:  Pt. And Mom report that mother lives in [address] and that she cannot drive at night. She is requesting room at Home away from home and reports that she has been sleeping in her car in the parking lot.  provides her with info for [employer] on [address] and she is unsure if she will go or just sleep in car again.  calls [facility] and puts mother Alessia Perez on waiting on waiting list.  Jermain Frye provides active listening, emotional support and prayer. SPIRITUAL CARE FOLLOW-UP PLAN:  Chaplains will remain available to offer spiritual and emotional support as needed.       Electronically signed by Mack Nelson on 12/29/2020 at 10:27 PM.  [email or web address]  [phone]

## 2020-12-31 VITALS
SYSTOLIC BLOOD PRESSURE: 118 MMHG | HEART RATE: 104 BPM | RESPIRATION RATE: 21 BRPM | WEIGHT: 169 LBS | TEMPERATURE: 97.4 F | OXYGEN SATURATION: 97 % | BODY MASS INDEX: 25.61 KG/M2 | HEIGHT: 68 IN | DIASTOLIC BLOOD PRESSURE: 80 MMHG

## 2020-12-31 LAB
GLUCOSE BLD-MCNC: 177 MG/DL (ref 75–110)
GLUCOSE BLD-MCNC: 179 MG/DL (ref 75–110)
GLUCOSE BLD-MCNC: 297 MG/DL (ref 75–110)
GLUTAMIC ACID DECARB AB: <5 IU/ML (ref 0–5)

## 2020-12-31 PROCEDURE — 99232 SBSQ HOSP IP/OBS MODERATE 35: CPT | Performed by: INTERNAL MEDICINE

## 2020-12-31 PROCEDURE — 2580000003 HC RX 258: Performed by: INTERNAL MEDICINE

## 2020-12-31 PROCEDURE — 6370000000 HC RX 637 (ALT 250 FOR IP): Performed by: NEUROLOGICAL SURGERY

## 2020-12-31 PROCEDURE — 94760 N-INVAS EAR/PLS OXIMETRY 1: CPT

## 2020-12-31 PROCEDURE — 6370000000 HC RX 637 (ALT 250 FOR IP): Performed by: STUDENT IN AN ORGANIZED HEALTH CARE EDUCATION/TRAINING PROGRAM

## 2020-12-31 PROCEDURE — 6360000002 HC RX W HCPCS: Performed by: NURSE PRACTITIONER

## 2020-12-31 PROCEDURE — 2580000003 HC RX 258: Performed by: NEUROLOGICAL SURGERY

## 2020-12-31 PROCEDURE — 82947 ASSAY GLUCOSE BLOOD QUANT: CPT

## 2020-12-31 PROCEDURE — 97116 GAIT TRAINING THERAPY: CPT

## 2020-12-31 PROCEDURE — 6370000000 HC RX 637 (ALT 250 FOR IP): Performed by: NURSE PRACTITIONER

## 2020-12-31 PROCEDURE — 87040 BLOOD CULTURE FOR BACTERIA: CPT

## 2020-12-31 PROCEDURE — 6360000002 HC RX W HCPCS: Performed by: NEUROLOGICAL SURGERY

## 2020-12-31 PROCEDURE — 6360000002 HC RX W HCPCS: Performed by: INTERNAL MEDICINE

## 2020-12-31 PROCEDURE — 36415 COLL VENOUS BLD VENIPUNCTURE: CPT

## 2020-12-31 PROCEDURE — 99233 SBSQ HOSP IP/OBS HIGH 50: CPT | Performed by: INTERNAL MEDICINE

## 2020-12-31 PROCEDURE — 97110 THERAPEUTIC EXERCISES: CPT

## 2020-12-31 RX ORDER — POTASSIUM CHLORIDE 20 MEQ/1
40 TABLET, EXTENDED RELEASE ORAL ONCE
Status: COMPLETED | OUTPATIENT
Start: 2020-12-31 | End: 2020-12-31

## 2020-12-31 RX ADMIN — FENTANYL CITRATE 25 MCG: 50 INJECTION, SOLUTION INTRAMUSCULAR; INTRAVENOUS at 08:22

## 2020-12-31 RX ADMIN — POTASSIUM CHLORIDE 40 MEQ: 1500 TABLET, EXTENDED RELEASE ORAL at 08:44

## 2020-12-31 RX ADMIN — OXYCODONE HYDROCHLORIDE AND ACETAMINOPHEN 2 TABLET: 5; 325 TABLET ORAL at 14:09

## 2020-12-31 RX ADMIN — INSULIN LISPRO 1 UNITS: 100 INJECTION, SOLUTION INTRAVENOUS; SUBCUTANEOUS at 14:12

## 2020-12-31 RX ADMIN — FENTANYL CITRATE 25 MCG: 50 INJECTION, SOLUTION INTRAMUSCULAR; INTRAVENOUS at 10:55

## 2020-12-31 RX ADMIN — FENTANYL CITRATE 25 MCG: 50 INJECTION, SOLUTION INTRAMUSCULAR; INTRAVENOUS at 01:37

## 2020-12-31 RX ADMIN — INSULIN LISPRO 3 UNITS: 100 INJECTION, SOLUTION INTRAVENOUS; SUBCUTANEOUS at 16:30

## 2020-12-31 RX ADMIN — DOCUSATE SODIUM 100 MG: 100 CAPSULE, LIQUID FILLED ORAL at 08:44

## 2020-12-31 RX ADMIN — INSULIN LISPRO 1 UNITS: 100 INJECTION, SOLUTION INTRAVENOUS; SUBCUTANEOUS at 08:26

## 2020-12-31 RX ADMIN — CEFAZOLIN 2 G: 10 INJECTION, POWDER, FOR SOLUTION INTRAVENOUS at 06:28

## 2020-12-31 RX ADMIN — INSULIN LISPRO 3 UNITS: 100 INJECTION, SOLUTION INTRAVENOUS; SUBCUTANEOUS at 08:33

## 2020-12-31 RX ADMIN — FENTANYL CITRATE 25 MCG: 50 INJECTION, SOLUTION INTRAMUSCULAR; INTRAVENOUS at 13:53

## 2020-12-31 RX ADMIN — FENTANYL CITRATE 25 MCG: 50 INJECTION, SOLUTION INTRAMUSCULAR; INTRAVENOUS at 16:18

## 2020-12-31 RX ADMIN — ENOXAPARIN SODIUM 40 MG: 40 INJECTION SUBCUTANEOUS at 08:43

## 2020-12-31 RX ADMIN — SODIUM CHLORIDE, PRESERVATIVE FREE 10 ML: 5 INJECTION INTRAVENOUS at 08:45

## 2020-12-31 RX ADMIN — FENTANYL CITRATE 25 MCG: 50 INJECTION, SOLUTION INTRAMUSCULAR; INTRAVENOUS at 03:43

## 2020-12-31 RX ADMIN — CYCLOBENZAPRINE 10 MG: 10 TABLET, FILM COATED ORAL at 08:44

## 2020-12-31 RX ADMIN — Medication 10 ML: at 08:45

## 2020-12-31 RX ADMIN — INSULIN LISPRO 3 UNITS: 100 INJECTION, SOLUTION INTRAVENOUS; SUBCUTANEOUS at 16:31

## 2020-12-31 RX ADMIN — INSULIN LISPRO 3 UNITS: 100 INJECTION, SOLUTION INTRAVENOUS; SUBCUTANEOUS at 14:13

## 2020-12-31 RX ADMIN — PANTOPRAZOLE SODIUM 40 MG: 40 TABLET, DELAYED RELEASE ORAL at 16:28

## 2020-12-31 RX ADMIN — POLYETHYLENE GLYCOL 3350 17 G: 17 POWDER, FOR SOLUTION ORAL at 08:42

## 2020-12-31 RX ADMIN — PANTOPRAZOLE SODIUM 40 MG: 40 TABLET, DELAYED RELEASE ORAL at 08:44

## 2020-12-31 RX ADMIN — CEFAZOLIN 2 G: 10 INJECTION, POWDER, FOR SOLUTION INTRAVENOUS at 14:09

## 2020-12-31 RX ADMIN — OXYCODONE HYDROCHLORIDE AND ACETAMINOPHEN 2 TABLET: 5; 325 TABLET ORAL at 06:25

## 2020-12-31 ASSESSMENT — PAIN DESCRIPTION - PROGRESSION
CLINICAL_PROGRESSION: NOT CHANGED
CLINICAL_PROGRESSION: NOT CHANGED

## 2020-12-31 ASSESSMENT — PAIN SCALES - GENERAL
PAINLEVEL_OUTOF10: 7
PAINLEVEL_OUTOF10: 8
PAINLEVEL_OUTOF10: 9
PAINLEVEL_OUTOF10: 8
PAINLEVEL_OUTOF10: 9
PAINLEVEL_OUTOF10: 7

## 2020-12-31 ASSESSMENT — PAIN DESCRIPTION - PAIN TYPE
TYPE: SURGICAL PAIN
TYPE: SURGICAL PAIN

## 2020-12-31 ASSESSMENT — PAIN - FUNCTIONAL ASSESSMENT: PAIN_FUNCTIONAL_ASSESSMENT: ACTIVITIES ARE NOT PREVENTED

## 2020-12-31 ASSESSMENT — PAIN DESCRIPTION - DESCRIPTORS
DESCRIPTORS: ACHING;DISCOMFORT
DESCRIPTORS: ACHING;DISCOMFORT

## 2020-12-31 ASSESSMENT — PAIN DESCRIPTION - ORIENTATION: ORIENTATION: LOWER

## 2020-12-31 NOTE — PROGRESS NOTES
[facility] - Progress Note    Today's Date and Time: 12/31/2020, 12:06 PM    Impression :   · History of IV drug abuse  · Thoracic epidural abscess with cord compression at T10 T12 level  · Back pain  · S aureus septicemia 12-27-20    Recommendations:   · Cefazolin 2 gm IV q 8 hr. Stop date 1-27-21  · Repeat blood cultures    Medical Decision Making/Summary/Discussion:12/31/2020     ·   Infection Control Recommendations   · [place] Precautions  · Contact Isolation MRSA    Antimicrobial Stewardship Recommendations     · Simplification of therapy  · Targeted therapy    Coordination of Outpatient Care:   · Estimated Length of IV antimicrobials: TBD  · Patient will need Midline Catheter Insertion: TBD  · Patient will need PICC line Insertion:TBD  · Patient will need: Home IV , [place],  SNF,  LTAC:TBD  · Patient will need outpatient wound care:TBD    Chief complaint/reason for consultation:   · Epidural abscess of the thoracic area      History of Present Illness:   Jeffrey Santiago is a 38y.o.-year-old male who was initially admitted on 12/27/2020. Patient seen at the request of You Richardson. INITIAL HISTORY:    Patient was transferred from [facility] because of the presence of an epidural abscess. The patient has had past history of IV drug abuse. He had developed back pain over the last few days and presented to [facility]. A CT scan at [facility] show the presence of an epidural abscess with possible cord compression noted at T9 T11 level. Patient was transferred to [facility] for neurosurgical care. The patient denied any fevers, chills, night sweats. He indicated the presence of severe back pain. He denied any loss of function of the arms or legs. He denied any sphincter dysfunction. Patient has been evaluated by the neurosurgical service will plan to do a decompressive procedure on 12/28/2020. The patient had blood cultures obtained and he has been placed on Zosyn and vancomycin pending further data. At [facility]. Karthik's he is being continued on vancomycin on place on cefepime. He has a prior history of MRSA. His Covid test was negative on 12/27/2020. CURRENT EVALUATION : 12/31/2020    Afebrile  VS stable    Patient reports feeling better  Denies any chest pain, shortness of breath, cough, hemoptysis, abdominal pain, nausea vomiting or diarrhea  He would like his Rust catheter removed    Culture from T9-11 laminectomy done 12/29/2020: MSSA  Blood cultures: MSSA     Diffuse incisional pain is better. The drain has been removed by neurosurgery. Incisional site is healing well. Discussed with discharge planner on 12/30/2020. Best option for him is to receive cefazolin at a facility  He could be treated with ceftriaxone 2  Gm IV daily as an alternative option  Treatment with once a week Dalbavancin or Oritavancin puts him at risk of developing osteomyelitis of the thoracic spine and possibly subsequent more difficult problems. Patient very frustrated with the difficulties finding a place for him to receive outpatient antibiotics. He wants to sign out AMA. I indicated that he is risking his life and the stability of the spine if he does sign out AMA. I indicated that S aureus septicemia has a 20 % mortality rate. He is not in a mood to listen to anything. IM notified. Family member present at bedside and witnessed the interaction. Medical student present. Labs, X rays reviewed: 12/31/2020    BUN: 14-->16-->13  Cr:0.57-->0.51-->0.56    WBC: 16.6-->16.3-->14.1  Hb: 13.4-->12.9-->13.4  Plat: 312-->300-->299    Cultures:  Urine:  · 12-27-20 pending  Blood:  · 12-27-20 MSSA  · 12-29-20 MSSA  Sputum :  ·   Wound:  · 12-27-20 : lt flank swab,  s aureus heavy growth  · 12-29-20 drainage from laminectomy: gram + cocci in clusters  · 12-30-20 drainage from laminectomy: S.  Aureus · 12-31-20 drainage from laminectomy: MSSA      I have personally reviewed the past medical history, past surgical history, medications, social history, and family history, and I have updated the database accordingly.   Past Medical History:     Past Medical History:   Diagnosis Date    Type 1 diabetes mellitus ([facility] [address]     dx 2016       Past Surgical  History:     Past Surgical History:   Procedure Laterality Date    FOOT DEBRIDEMENT Right     2018    LAMINECTOMY POSTERIOR THORACIC / LUMBAR N/A 12/28/2020    POSTERIOR THORACIC LAMINECTOMY  T10-T12, EVACUATION OF ABSCESS performed by Italo Mata MD at [address] [place] [address]  12/28/2020    POSTERIOR THORACIC LAMINECTOMY  T10-T12, EVACUATION OF ABSCESS        Medications:      insulin glargine  20 Units Subcutaneous Nightly    enoxaparin  40 mg Subcutaneous Daily    pantoprazole  40 mg Oral BID AC    senna  1 tablet Oral Nightly    polyethylene glycol  17 g Oral Daily    docusate sodium  100 mg Oral Daily    insulin lispro  3 Units Subcutaneous TID WC    ceFAZolin  2 g Intravenous Q8H    melatonin  5 mg Oral Once    insulin lispro  0-6 Units Subcutaneous TID WC    insulin lispro  0-3 Units Subcutaneous Nightly    sodium chloride flush  10 mL Intravenous 2 times per day    sodium chloride flush  10 mL Intravenous 2 times per day    sodium chloride  20 mL Intravenous Once       Social History:     Social History     Socioeconomic History    Marital status: Unknown     Spouse name: Not on file    Number of children: Not on file    Years of education: Not on file    Highest education level: Not on file   Occupational History    Not on file   Social Needs    Financial resource strain: Not on file    Food insecurity     Worry: Not on file     Inability: Not on file    Transportation needs     Medical: Not on file     Non-medical: Not on file   Tobacco Use    Smoking status: Current Every Day Smoker     Packs/day: 0.50 Years: 20.00     Pack years: 10.00    Tobacco comment: 1/2 pack per day   Substance and Sexual Activity    Alcohol use: Never     Frequency: Never    Drug use: Yes     Comment: Heroin use in 2006    Sexual activity: Not on file   Lifestyle    Physical activity     Days per week: Not on file     Minutes per session: Not on file    Stress: Not on file   Relationships    Social connections     Talks on phone: Not on file     Gets together: Not on file     Attends Advent service: Not on file     Active member of club or organization: Not on file     Attends meetings of clubs or organizations: Not on file     Relationship status: Not on file    Intimate partner violence     Fear of current or ex partner: Not on file     Emotionally abused: Not on file     Physically abused: Not on file     Forced sexual activity: Not on file   Other Topics Concern    Not on file   Social History Narrative    Not on file       Family History:   No family history on file. Allergies:   Bactrim [sulfamethoxazole-trimethoprim] and Toradol [ketorolac tromethamine]     Review of Systems:   Constitutional: No fevers or chills. No systemic complaints  Head: No headaches  Eyes: No double vision or blurry vision. No conjunctival inflammation. ENT: No sore throat or runny nose. . No hearing loss, tinnitus or vertigo. Cardiovascular: No chest pain or palpitations. No shortness of breath. No ALMEIDA  Lung: No shortness of breath or cough. No sputum production  Abdomen: No nausea, vomiting, diarrhea, or abdominal pain. Lesle Gulling No cramps. Genitourinary: No increased urinary frequency, or dysuria. No hematuria. No suprapubic or CVA pain  Musculoskeletal: No muscle aches or pains. No joint effusions, swelling or deformities. Back pain in the thoracic area - improved since yesterday. Hematologic: No bleeding or bruising. Neurologic: No headache, weakness, numbness, or tingling. Integument: No rash, no ulcers. Psychiatric: No depression. Endocrine: No polyuria, no polydipsia, no polyphagia. Physical Examination :     Patient Vitals for the past 8 hrs:   BP Temp Temp src Pulse Resp SpO2   12/31/20 1140 138/85 97.6 °F (36.4 °C) Oral 73 16 97 %   12/31/20 0754 (!) 140/84 97.5 °F (36.4 °C) Oral 68 18 97 %     General Appearance: Awake, alert, and in no apparent distress  Head:  Normocephalic, no trauma  Eyes: Pupils equal, round, reactive to light and accommodation; extraocular movements intact; sclera anicteric; conjunctivae pink. No embolic phenomena. ENT: Oropharynx clear, without erythema, exudate, or thrush. No tenderness of sinuses. Mouth/throat: mucosa pink and moist. No lesions. Dentition in good repair. Neck:Supple, without lymphadenopathy. Thyroid normal, No bruits. Pulmonary/Chest: Clear to auscultation, without wheezes, rales, or rhonchi. No dullness to percussion. Cardiovascular: Regular rate and rhythm without murmurs, rubs, or gallops. Abdomen: Soft, non tender. Bowel sounds normal. No organomegaly  All four Extremities: No cyanosis, clubbing, edema, or effusions. Neurologic: No gross sensory or motor deficits. Skin: Warm and dry with good turgor. No signs of peripheral arterial or venous insufficiency. No ulcerations. Surgical site on back appears to be healing well. Medical Decision Making -Laboratory:   I have independently reviewed/ordered the following labs:    CBC with Differential:   Recent Labs     12/29/20  0919 12/30/20  1232   WBC 16.3* 14.1*   HGB 12.9* 13.4   HCT 39.3* 38.5*    299   LYMPHOPCT 5* 15*   MONOPCT 13* 12     BMP:   Recent Labs     12/29/20  0919 12/30/20  1232   * 130*   K 3.8 3.6*   CL 94* 94*   CO2 18* 24   BUN 16 13   CREATININE 0.51* 0.56*   MG  --  1.6     Hepatic Function Panel:   No results for input(s): PROT, LABALBU, BILIDIR, IBILI, BILITOT, ALKPHOS, ALT, AST in the last 72 hours. No results for input(s): RPR in the last 72 hours.

## 2020-12-31 NOTE — PROGRESS NOTES
Neurosurgery BENIGNO/Resident    Daily Progress Note   CC:  Chief Complaint   Patient presents with    Back Pain     From [facility], epidural abscess with cord compression at T9-T11. Received 4.5 g Zosyn, 150 mg Ketamine, 2 mg Dilaudid, 8 mg Versed, and Vanco started PTA     12/31/2020  9:56 AM    No events overnight. Doing well. No weakness. Vitals:    12/30/20 2011 12/31/20 0010 12/31/20 0330 12/31/20 0754   BP: 114/62 127/79 (!) 140/81 (!) 140/84   Pulse: 79 74 75 68   Resp: 19 24 22 18   Temp: 98.7 °F (37.1 °C) 97.8 °F (36.6 °C) 97.5 °F (36.4 °C) 97.5 °F (36.4 °C)   TempSrc: Oral Oral Oral Oral   SpO2:  98% 97% 97%   Weight:       Height:           PE:   AOx3   Motor   L iliopsoas 5/5 , R iliopsoas 5/5  L quadriceps 5/5; R quadriceps 5/5  L Dorsiflexion 5/5; R dorsiflexion 5/5  L Plantarflexion 5/5; R plantarflexion 5/5  L EHL 5/5; R EHL 5/5    Sensation: intact     Incision: clean dry intact closed with staples no drainage noted       Lab Results   Component Value Date    WBC 14.1 (H) 12/30/2020    HGB 13.4 12/30/2020    HCT 38.5 (L) 12/30/2020     12/30/2020    ALT 25 12/27/2020    AST 17 12/27/2020     (L) 12/30/2020    K 3.6 (L) 12/30/2020    CL 94 (L) 12/30/2020    CREATININE 0.56 (L) 12/30/2020    BUN 13 12/30/2020    CO2 24 12/30/2020    INR 0.9 12/27/2020    LABA1C 12.5 (H) 12/27/2020    CRP 85.4 (H) 12/27/2020       A/P  38 y.o. male who presents with back pain and weakness, thoracici epidural abscess  POD #3 s/p T1-0 laminectomy and evacuation of epidural abscess      - NSG to s/o at this time.  Follow up with Dr. Emili Sabillon in 2 weeks

## 2020-12-31 NOTE — CARE COORDINATION
Transitional planning:  Called [facility] SNF in [place], [place] and [place], [place], unit mng. states they will look at the referral tomorrow am. Phone number: [phone]  Notified RN, Vamsi Michelle, will notify pt.     [phone] Met with pt at bedside, he was informed that Rony Haley, unit mng will review referral to [address] [place] [address] rehab facility in am. He voiced that he was still leaving as soon as his mother brings his clothes. Vamsi Michelle RN notified.

## 2020-12-31 NOTE — PROGRESS NOTES
Physical Therapy  Facility/Department: [facility] NEURO  Daily Treatment Note  NAME: Funmilayo Mansfield  : 1985  MRN: [id number]    Date of Service: 2020    Discharge Recommendations:  Patient would benefit from continued therapy after discharge   PT Equipment Recommendations  Equipment Needed: No    Assessment   Body structures, Functions, Activity limitations: Decreased functional mobility ; Decreased strength;Decreased endurance;Decreased balance; Increased pain  Assessment: Pt was grossly required SBA for all mobility including ambulating 300+' no AD. Pt is limited by increased pain with mobility and would benefit from continued therapy after d/c  Prognosis: Good  REQUIRES PT FOLLOW UP: Yes  Activity Tolerance  Activity Tolerance: Patient limited by fatigue;Patient limited by pain     Patient Diagnosis(es): The encounter diagnosis was Epidural abscess. has a past medical history of Type 1 diabetes mellitus ([facility] [address] has a past surgical history that includes Foot Debridement (Right); Thoracic spine surgery (2020); and LAMINECTOMY POSTERIOR THORACIC / LUMBAR (N/A, 2020). Restrictions  Restrictions/Precautions  Restrictions/Precautions: General Precautions, Surgical Protocols, Fall Risk  Required Braces or Orthoses?: No  Position Activity Restriction  Spinal Precautions: No Bending, No Lifting, No Twisting  Other position/activity restrictions: ambulate pt, up with assistance  Subjective   General  Chart Reviewed: Yes  Response To Previous Treatment: Patient with no complaints from previous session.   Family / Caregiver Present: No  Subjective  Subjective: Pt sitting in chair upon arrival, RN and pt agreeable to PT  Pain Screening  Patient Currently in Pain: Yes  Pain Assessment  Pain Assessment: 0-10  Pain Level: 7  Patient's Stated Pain Goal: No pain  Pain Type: Surgical pain  Pain Location: Back  Pain Orientation: Lower  Pain Descriptors: Aching;Discomfort  Pain Frequency: Continuous Pain Onset: On-going  Clinical Progression: Not changed  Functional Pain Assessment: Activities are not prevented  Non-Pharmaceutical Pain Intervention(s): Ambulation/Increased Activity  Response to Pain Intervention: Patient Satisfied  Vital Signs  Patient Currently in Pain: Yes       Orientation  Orientation  Overall Orientation Status: Within Normal Limits  Cognition      Objective   Bed mobility  Comment: in chair  Transfers  Sit to Stand: Stand by assistance  Stand to sit: Stand by assistance  Comment: no AD  Ambulation  Ambulation?: Yes  Ambulation 1  Surface: level tile  Device: No Device  Assistance: Stand by assistance  Quality of Gait: no LOB  Gait Deviations: Decreased step length;Decreased step height  Distance: 300+ ft     Balance  Posture: Good  Sitting - Static: Good  Sitting - Dynamic: Good  Standing - Static: Good;-  Standing - Dynamic: Good;-  Comments: standing balance assessed without AD  Exercises:  Standing exercise program: Heel/toe raises, hip flexion, hip abduction, mini squats, hip extension, and hamstring curls.  Reps: x10 with RW  Goals  Short term goals  Time Frame for Short term goals: 14 visits  Short term goal 1: Pt to ambulate 300ft modified independently with least restrictive device  Short term goal 2: Pt to sit <> stand transfer independently  Short term goal 3: Pt to demonstrate independent bed mobility  Short term goal 4: Pt to ascend/descend flight of stairs with rail modified independently to allow for access to bedroom level  Short term goal 5: Pt to demonstrate good standing balance to decrease risk of falls  Patient Goals   Patient goals : Pt would like to get back home    Plan    Plan  Times per week: 5-6x  Times per day: Daily Current Treatment Recommendations: Strengthening, Balance Training, Functional Mobility Training, Transfer Training, Endurance Training, Gait Training, Stair training, Patient/Caregiver Education & Training, Home Exercise Program, Safety Education & Training  Safety Devices  Type of devices: Call light within reach, Gait belt, Left in bed, Nurse notified  Restraints  Initially in place: No     Therapy Time   Individual Concurrent Group Co-treatment   Time In 1040         Time Out 1105         Minutes 25         Timed Code Treatment Minutes: [address] [facility], [facility]

## 2020-12-31 NOTE — PROGRESS NOTES
Occupational Therapy Not Seen Note    DATE: 2020  Name: Leona Fierro  : 1985  MRN: [id number]    Patient not available for Occupational Therapy due to:  Pt wanting to leave AMA     Next Scheduled Treatment: pt plans to leave    Electronically signed by TAVO Rice on 2020 at 1:07 PM

## 2020-12-31 NOTE — PROGRESS NOTES
Patient calls out, had just talked with discharge planner.  States he wants to leave [facility]

## 2020-12-31 NOTE — PROGRESS NOTES
Discussed with Dr. Malvin Dobson regarding his conversation with patient and need to continue with IV antibiotic while plan is made for placement. Patient was not cooperative and insist on leaving. I called and speak with patient on this phone. I explained again there is no PO antibiotic for his sepsis and osteomyelitis. I encouraged him to stay and continue IV antibiotic while  work to get placement close to him. He fully understand that he can be paralyzed and or die if his infection is not fully treated.     Electronically signed by Genny Torres MD on 12/31/2020 at 2:00 PM

## 2020-12-31 NOTE — PROGRESS NOTES
Perfect serve to Internal Med /is now [facility]  Med team on call.  Informed of patient wants to leave AMA

## 2020-12-31 NOTE — PROGRESS NOTES
Return message Rosalinda Rios counseled many times today, if he still wants to leave, let him sign AMA papers, take out IJ, and let him leave\".

## 2020-12-31 NOTE — PROGRESS NOTES
[facility]  Internal Medicine Teaching Residency Program  Inpatient Daily Progress Note  ______________________________________________________________________________    Patient: Rodrick Britton  YOB: 1985   ENT:[id number]    Acct: [de-identified]     Room: [facility]6727-86  Admit date: 12/27/2020  Today's date: 12/31/20  Number of days in the hospital: 4    SUBJECTIVE   Admitting Diagnosis: Epidural abscess  CC: Low Back Pain   Pt examined at bedside. Chart & results reviewed.      No acute episodes overnight  Vitals stable. Saturating in 90s on room air. Labs awaited this a.m. Requires IV antibiotics till 1/27/2021   helping with placement. ROS:  Constitutional:  negative for chills, fevers, sweats  Respiratory:  negative for cough, dyspnea on exertion, hemoptysis, shortness of breath, wheezing  Cardiovascular:  negative for chest pain, chest pressure/discomfort, lower extremity edema, palpitations  Gastrointestinal:  negative for abdominal pain, constipation, diarrhea, nausea, vomiting  Neurological: Positive for low back pain   BRIEF HISTORY     The patient is a pleasant 35 y. o. male PMH significant for diabetes mellitus type 2, IV drug use presents with a chief complaint of lower back pain for past several days.     According to the patient, he started having lower back pain 3 to 4 days back, but not associated with any fevers, chills, numbness, tingling, weakness of bilateral lower legs.  He initially went to [facility] ED, was transferred to [facility] upon MRI with contrast finding of T10-T12 spinal epidural fluid collection. Alveda Sham denied any saddle anesthesia, urinary or fecal incontinence.  Patient states that he last used IV drugs in 2014.     Please advise/place lab orders for scheduled lab appointment on 10/9/17. Thank you!   In the ED, patient was hypertensive /87, HR 68, saturating 99% on room air.  Labs were unremarkable except for hyperglycemia blood glucose 301, elevated WBC count 16.6, elevated procalcitonin 0.28, elevated CRP 85.4.  Hemoglobin A1c 12.5. ESR 64.     Wound culture ordered.  Direct exam showed many gram-positive cocci in clusters. On physical exam, patient has strength 5/5 in all 4 extremities, sensation intact, cranial nerves grossly intact. OBJECTIVE     Vital Signs:  /85   Pulse 73   Temp 97.6 °F (36.4 °C) (Oral)   Resp 16   Ht 5' 8\" (1.727 m)   Wt 169 lb (76.7 kg)   SpO2 97%   BMI 25.70 kg/m²     Temp (24hrs), Av °F (36.7 °C), Min:97.5 °F (36.4 °C), Max:99 °F (37.2 °C)    In: -   Out: 1450 [Urine:1450]    Physical Exam:   Constitutional: This is a well developed, well nourished 38y.o. year old male who is alert, oriented, cooperative and in no apparent distress.    Respiratory: Bilateral breath sounds clear to auscultation.  No wheezes or crackles  Cardiovascular: Regular without murmur, clicks, gallops or rubs. Abdomen: Soft, nontender. No distention  Extremities:  No lower extremity edema. Spinal dressing normal. Lower back discomfort.    Skin:  Warm and dry  Neurological/Psychiatric: The patient's general behavior, level of consciousness, thought content and emotional status is normal.           Medications:  Scheduled Medications:    insulin glargine  20 Units Subcutaneous Nightly    enoxaparin  40 mg Subcutaneous Daily    pantoprazole  40 mg Oral BID AC    senna  1 tablet Oral Nightly    polyethylene glycol  17 g Oral Daily    docusate sodium  100 mg Oral Daily    insulin lispro  3 Units Subcutaneous TID WC    ceFAZolin  2 g Intravenous Q8H    melatonin  5 mg Oral Once    insulin lispro  0-6 Units Subcutaneous TID WC    insulin lispro  0-3 Units Subcutaneous Nightly    sodium chloride flush  10 mL Intravenous 2 times per day  sodium chloride flush  10 mL Intravenous 2 times per day    sodium chloride  20 mL Intravenous Once     Continuous Infusions:    dextrose       PRN Medications    calcium carbonate, 500 mg, TID PRN      magnesium hydroxide, 15 mL, Q8H PRN      cyclobenzaprine, 10 mg, BID PRN      oxyCODONE-acetaminophen, 1 tablet, Q6H PRN    Or      oxyCODONE-acetaminophen, 2 tablet, Q6H PRN      sodium chloride flush, 10 mL, PRN      sodium chloride flush, 10 mL, PRN      potassium chloride, 40 mEq, PRN    Or      potassium alternative oral replacement, 40 mEq, PRN    Or      potassium chloride, 10 mEq, PRN      magnesium sulfate, 1 g, PRN      promethazine, 12.5 mg, Q6H PRN    Or      ondansetron, 4 mg, Q6H PRN      nicotine, 1 patch, Daily PRN      acetaminophen, 650 mg, Q6H PRN    Or      acetaminophen, 650 mg, Q6H PRN      fentanNYL, 25 mcg, Q2H PRN      melatonin, 3 mg, Nightly PRN      glucose, 15 g, PRN      dextrose, 12.5 g, PRN      glucagon (rDNA), 1 mg, PRN      dextrose, 100 mL/hr, PRN        Diagnostic Labs:  CBC:   Recent Labs     12/29/20  0919 12/30/20  1232   WBC 16.3* 14.1*   RBC 4.42 4.60   HGB 12.9* 13.4   HCT 39.3* 38.5*   MCV 88.9 83.7   RDW 11.7* 11.6*    299     BMP:   Recent Labs     12/29/20  0919 12/30/20  1232   * 130*   K 3.8 3.6*   CL 94* 94*   CO2 18* 24   BUN 16 13   CREATININE 0.51* 0.56*       ASSESSMENT & PLAN       Spinal Epidural abscess secondary to IV drug abuse and uncontrolled diabetes  -S/P T10 laminectomy and epidural abscess evacuation 12/28/2020  -IV Ancef2 g q8 for MSSA till 1/27/2021  -ID following. Appreciate them seeing patient and their recommendations.  Patient will require PICC line at time of discharge.     Diabetes mellitus  -HbA1c 10.4  -LISA-65 antibody pending   -Lantus 20 units daily and lispro 3 units TID AC.    -Will continue to monitor     IV Drug Use  -Counseled       DVT ppx : Lovenox  GI ppx: Protonix     PT/OT/SW: On board Discharge Planning: LTACH placement pending.  to assist at discharge, patient has history of IV drug use and will require PICC line for IV antibiotics till 1-    Chana Salcido MD  Internal Medicine Resident, PGY-1  [address], [place]  12/31/2020, 12:08 PM      Attending Physician Statement  I have discussed the case, including pertinent history and exam findings with the resident and the team.  I have seen and examined the patient and the key elements of the encounter have been performed by me. I agree with the assessment, plan and orders as documented by the resident. In Brief:  Patient is doing well. Vital signs stable. No evidence of active infection. He is insisting on going home and would like p.o. antibiotics. I have discussed with him about need for him to continue with IV antibiotics all the way to 27 January. I will have ID review to see if there is any option for p.o. antibiotics.     Bre Tse MD   Attending Physician, Internal Medicine Residency Program  12/31/2020, 12:15 PM

## 2020-12-31 NOTE — PROGRESS NOTES
[facility] - Progress Note    Today's Date and Time: 12/31/2020, 10:48 AM    Impression :   · History of IV drug abuse  · Thoracic epidural abscess with possible cord compression at T10 T12 level  · Back pain  · S aureus septicemia 12-27-20    Recommendations:   · Cefazolin 2 gm IV q 8 hr. Stop date 1-27-21    Medical Decision Making/Summary/Discussion:12/31/2020     ·   Infection Control Recommendations   · [place] Precautions  · Contact Isolation MRSA    Antimicrobial Stewardship Recommendations     · Simplification of therapy  · Targeted therapy    Coordination of Outpatient Care:   · Estimated Length of IV antimicrobials: TBD  · Patient will need Midline Catheter Insertion: TBD  · Patient will need PICC line Insertion:TBD  · Patient will need: Home IV , [place],  SNF,  LTAC:TBD  · Patient will need outpatient wound care:TBD    Chief complaint/reason for consultation:   · Epidural abscess of the thoracic area      History of Present Illness:   Milla Cordero is a 38y.o.-year-old male who was initially admitted on 12/27/2020. Patient seen at the request of Laila Lancaster. INITIAL HISTORY:    Patient was transferred from [facility] because of the presence of an epidural abscess. The patient has had past history of IV drug abuse. He had developed back pain over the last few days and presented to [facility]. A CT scan at [facility] show the presence of an epidural abscess with possible cord compression noted at T9 T11 level. Patient was transferred to [facility] for neurosurgical care. The patient denied any fevers, chills, night sweats. He indicated the presence of severe back pain. He denied any loss of function of the arms or legs. He denied any sphincter dysfunction. Patient has been evaluated by the neurosurgical service will plan to do a decompressive procedure on 12/28/2020. POSTERIOR THORACIC LAMINECTOMY  T10-T12, EVACUATION OF ABSCESS performed by Landon Alvarez MD at [address] [place] [address] Drive  12/28/2020    POSTERIOR THORACIC LAMINECTOMY  T10-T12, EVACUATION OF ABSCESS        Medications:      insulin glargine  20 Units Subcutaneous Nightly    enoxaparin  40 mg Subcutaneous Daily    pantoprazole  40 mg Oral BID AC    senna  1 tablet Oral Nightly    polyethylene glycol  17 g Oral Daily    docusate sodium  100 mg Oral Daily    insulin lispro  3 Units Subcutaneous TID WC    ceFAZolin  2 g Intravenous Q8H    melatonin  5 mg Oral Once    insulin lispro  0-6 Units Subcutaneous TID WC    insulin lispro  0-3 Units Subcutaneous Nightly    sodium chloride flush  10 mL Intravenous 2 times per day    sodium chloride flush  10 mL Intravenous 2 times per day    sodium chloride  20 mL Intravenous Once       Social History:     Social History     Socioeconomic History    Marital status: Unknown     Spouse name: Not on file    Number of children: Not on file    Years of education: Not on file    Highest education level: Not on file   Occupational History    Not on file   Social Needs    Financial resource strain: Not on file    Food insecurity     Worry: Not on file     Inability: Not on file    Transportation needs     Medical: Not on file     Non-medical: Not on file   Tobacco Use    Smoking status: Current Every Day Smoker     Packs/day: 0.50     Years: 20.00     Pack years: 10.00    Tobacco comment: 1/2 pack per day   Substance and Sexual Activity    Alcohol use: Never     Frequency: Never    Drug use: Yes     Comment: Heroin use in 2006    Sexual activity: Not on file   Lifestyle    Physical activity     Days per week: Not on file     Minutes per session: Not on file    Stress: Not on file   Relationships    Social connections     Talks on phone: Not on file     Gets together: Not on file     Attends Rastafari service: Not on file Active member of club or organization: Not on file     Attends meetings of clubs or organizations: Not on file     Relationship status: Not on file    Intimate partner violence     Fear of current or ex partner: Not on file     Emotionally abused: Not on file     Physically abused: Not on file     Forced sexual activity: Not on file   Other Topics Concern    Not on file   Social History Narrative    Not on file       Family History:   No family history on file. Allergies:   Bactrim [sulfamethoxazole-trimethoprim] and Toradol [ketorolac tromethamine]     Review of Systems:   Constitutional: No fevers or chills. No systemic complaints  Head: No headaches  Eyes: No double vision or blurry vision. No conjunctival inflammation. ENT: No sore throat or runny nose. . No hearing loss, tinnitus or vertigo. Cardiovascular: No chest pain or palpitations. No shortness of breath. No ALMEIDA  Lung: No shortness of breath or cough. No sputum production  Abdomen: No nausea, vomiting, diarrhea, or abdominal pain. [employer] No cramps. Genitourinary: No increased urinary frequency, or dysuria. No hematuria. No suprapubic or CVA pain  Musculoskeletal: No muscle aches or pains. No joint effusions, swelling or deformities. Back pain in the thoracic area - improved since yesterday. Hematologic: No bleeding or bruising. Neurologic: No headache, weakness, numbness, or tingling. Integument: No rash, no ulcers. Psychiatric: No depression. Endocrine: No polyuria, no polydipsia, no polyphagia.     Physical Examination :     Patient Vitals for the past 8 hrs:   BP Temp Temp src Pulse Resp SpO2   12/31/20 0754 (!) 140/84 97.5 °F (36.4 °C) Oral 68 18 97 %   12/31/20 0330 (!) 140/81 97.5 °F (36.4 °C) Oral 75 22 97 %     General Appearance: Awake, alert, and in no apparent distress  Head:  Normocephalic, no trauma

## 2021-01-02 LAB
CULTURE: ABNORMAL
CULTURE: ABNORMAL
CULTURE: NORMAL
DIRECT EXAM: ABNORMAL
DIRECT EXAM: ABNORMAL
Lab: ABNORMAL
Lab: NORMAL
SPECIMEN DESCRIPTION: ABNORMAL
SPECIMEN DESCRIPTION: NORMAL

## 2021-01-04 ENCOUNTER — TELEPHONE (OUTPATIENT)
Dept: UROLOGY | Age: 36
End: 2021-01-04

## 2021-01-04 LAB
EKG ATRIAL RATE: 67 BPM
EKG P AXIS: 46 DEGREES
EKG P-R INTERVAL: 96 MS
EKG Q-T INTERVAL: 426 MS
EKG QRS DURATION: 78 MS
EKG QTC CALCULATION (BAZETT): 450 MS
EKG R AXIS: 74 DEGREES
EKG T AXIS: 69 DEGREES
EKG VENTRICULAR RATE: 67 BPM

## 2021-01-04 NOTE — TELEPHONE ENCOUNTER
----- Message from Opal Ashley MD sent at 12/30/2020  9:28 AM EST -----  Hi  ,    Could you please arrange follow up for Rodrigo Leiva in 1 weeks with Dr Han (or partner)? He will need a void trial at that time. He had laminectomy. Lisa Alexis

## 2021-01-04 NOTE — TELEPHONE ENCOUNTER
CALLED PT TO SCHEDULE NP F/U FROM ED. PT STATES CATH HAS BEEN TAKEN OUT AND DOES NOT NEED TO SEE UROLOGY AT THIS TIME. ADVISED PT IF HE NEEDS TO BE SEEN IN FUTURE TO GIVE US A CALL. PT VERBALIZED UNDERSTANDING.

## 2021-01-06 LAB
CULTURE: NORMAL
CULTURE: NORMAL
Lab: NORMAL
Lab: NORMAL
SPECIMEN DESCRIPTION: NORMAL
SPECIMEN DESCRIPTION: NORMAL

## 2021-01-08 LAB
CULTURE: NORMAL
DIRECT EXAM: NORMAL
Lab: NORMAL
SPECIMEN DESCRIPTION: NORMAL

## 2021-01-09 LAB
CULTURE: NORMAL
DIRECT EXAM: NORMAL
Lab: NORMAL
SPECIMEN DESCRIPTION: NORMAL

## 2021-01-13 NOTE — DISCHARGE SUMMARY
[address]     Department of Internal Medicine - Staff Internal Medicine Teaching Service    INPATIENT DISCHARGE SUMMARY      Patient Identification:  Bruce Estrella is a 38 y.o. male. :  1985  MRN: [id number]     Acct: [de-identified]   PCP: Bora Shay MD  Admit Date:  2020  Discharge date and time: 2020  8:24 PM   Attending Provider: No att. providers found                                     [address] [facility] Problem Lists:  Principal Problem:    Epidural abscess  Active Problems:    Type 2 diabetes mellitus ([facility] [address]    IV drug abuse ([facility] [address]  Resolved Problems:    * No resolved hospital problems.  *      HOSPITAL STAY     Brief Inpatient course:   Bruce Estrella is a 38 y.o. male with PMH significant for diabetes mellitus type 2, IV drug use, who was admitted for the management of Epidural abscess, presented to the emergency department with a chief complaint of lower back pain for past several days.    According to the patient, he started having lower back pain 3 to 4 days back, but not associated with any fevers, chills, numbness, tingling, weakness of bilateral lower legs.  He initially went to [facility] ED, was transferred to [facility] upon MRI with contrast finding of T10-T12 spinal epidural fluid collection. Brandon Patel denied any saddle anesthesia, urinary or fecal incontinence.  Patient stated that he last used IV drugs in 2014. Direct exam showed many gram-positive cocci in clusters, culture was positive for MSSA. Neurosurgery was consulted, performed T10 laminectomy and epidural abscess evacuation 12/29/2020. ID was consulted, patient was treated with IV Ancef2 g q8 for MSSA. Patient required PICC line for IV antibiotic however had h/o drug use so case management was working on finding SNF placement however patient refused to remain admitted until SNF placement was completed. He was counseled by both primary team as well as ID of the risks of untreated S Aureus septicemia and need for IV antibiotics but refused to stay and left AMA.       Procedures/ Significant Interventions:    T10 laminectomy and epidural abscess evacuation 12/29/2020    Consults:     Consults:     Final Specialist Recommendations/Findings:   IP CONSULT TO NEUROSURGERY  IP CONSULT TO INTERNAL MEDICINE  IP CONSULT TO CASE MANAGEMENT  IP CONSULT TO INFECTIOUS DISEASES  IP CONSULT TO DIABETES EDUCATOR      Any Hospital Acquired Infections: none    Discharge Functional Status:  stable    DISCHARGE PLAN     Disposition: left AMA    Patient Instructions:   Discharge Medication List as of 12/31/2020  8:24 PM      CONTINUE these medications which have NOT CHANGED    Details   metFORMIN (GLUCOPHAGE) 500 MG tablet Take 500 mg by mouth 2 times daily (with meals)Historical Med      glipiZIDE (GLUCOTROL) 5 MG tablet Take 5 mg by mouth 2 times daily (before meals)Historical Med

## 2021-01-21 ENCOUNTER — HOSPITAL ENCOUNTER (OUTPATIENT)
Age: 36
Setting detail: SPECIMEN
Discharge: HOME | End: 2021-01-21
Payer: COMMERCIAL

## 2021-01-21 LAB
ABSOLUTE EOS #: 0.48 K/UL (ref 0–0.44)
ABSOLUTE IMMATURE GRANULOCYTE: 0.06 K/UL (ref 0–0.3)
ABSOLUTE LYMPH #: 2.26 K/UL (ref 1.1–3.7)
ABSOLUTE MONO #: 0.82 K/UL (ref 0.1–1.2)
ANION GAP SERPL CALCULATED.3IONS-SCNC: 12 MMOL/L (ref 9–17)
BASOPHILS # BLD: 1 % (ref 0–2)
BASOPHILS ABSOLUTE: 0.07 K/UL (ref 0–0.2)
BUN BLDV-MCNC: 11 MG/DL (ref 6–20)
BUN/CREAT BLD: ABNORMAL (ref 9–20)
C-REACTIVE PROTEIN: <3 MG/L (ref 0–5)
CALCIUM SERPL-MCNC: 9.3 MG/DL (ref 8.6–10.4)
CHLORIDE BLD-SCNC: 101 MMOL/L (ref 98–107)
CO2: 21 MMOL/L (ref 20–31)
CREAT SERPL-MCNC: 0.52 MG/DL (ref 0.7–1.2)
DIFFERENTIAL TYPE: ABNORMAL
EOSINOPHILS RELATIVE PERCENT: 4 % (ref 1–4)
GFR AFRICAN AMERICAN: >60 ML/MIN
GFR NON-AFRICAN AMERICAN: >60 ML/MIN
GFR SERPL CREATININE-BSD FRML MDRD: ABNORMAL ML/MIN/{1.73_M2}
GFR SERPL CREATININE-BSD FRML MDRD: ABNORMAL ML/MIN/{1.73_M2}
GLUCOSE BLD-MCNC: 347 MG/DL (ref 70–99)
HCT VFR BLD CALC: 42.1 % (ref 40.7–50.3)
HEMOGLOBIN: 13.9 G/DL (ref 13–17)
IMMATURE GRANULOCYTES: 1 %
LYMPHOCYTES # BLD: 20 % (ref 24–43)
MCH RBC QN AUTO: 28.9 PG (ref 25.2–33.5)
MCHC RBC AUTO-ENTMCNC: 33 G/DL (ref 28.4–34.8)
MCV RBC AUTO: 87.5 FL (ref 82.6–102.9)
MONOCYTES # BLD: 7 % (ref 3–12)
NRBC AUTOMATED: 0 PER 100 WBC
PDW BLD-RTO: 12.3 % (ref 11.8–14.4)
PLATELET # BLD: 395 K/UL (ref 138–453)
PLATELET ESTIMATE: ABNORMAL
PMV BLD AUTO: 10.2 FL (ref 8.1–13.5)
POTASSIUM SERPL-SCNC: 5 MMOL/L (ref 3.7–5.3)
RBC # BLD: 4.81 M/UL (ref 4.21–5.77)
RBC # BLD: ABNORMAL 10*6/UL
SEDIMENTATION RATE, ERYTHROCYTE: 46 MM (ref 0–15)
SEG NEUTROPHILS: 67 % (ref 36–65)
SEGMENTED NEUTROPHILS ABSOLUTE COUNT: 7.65 K/UL (ref 1.5–8.1)
SODIUM BLD-SCNC: 134 MMOL/L (ref 135–144)
WBC # BLD: 11.3 K/UL (ref 3.5–11.3)
WBC # BLD: ABNORMAL 10*3/UL

## 2021-05-04 NOTE — PROGRESS NOTES
Physical Therapy  Facility/Department: [facility] NEURO  Daily Treatment Note  NAME: Jamal Carlos  : 1985  MRN: [id number]    Date of Service: 2020    Discharge Recommendations:  Patient would benefit from continued therapy after discharge   PT Equipment Recommendations  Equipment Needed: No    Assessment   Body structures, Functions, Activity limitations: Decreased functional mobility ; Decreased strength;Decreased endurance;Decreased balance; Increased pain  Assessment: Pt was grossly required SBA/CGA for all mobility including ambulating 300' with a RW and 250' no AD. Pt is limited by increased pain with mobility and would benefit from continued therapy after d/c  Prognosis: Good  REQUIRES PT FOLLOW UP: Yes  Activity Tolerance  Activity Tolerance: Patient limited by fatigue;Patient limited by pain     Patient Diagnosis(es): The encounter diagnosis was Epidural abscess. has a past medical history of Type 1 diabetes mellitus ([facility] [address] has a past surgical history that includes Foot Debridement (Right); Thoracic spine surgery (2020); and LAMINECTOMY POSTERIOR THORACIC / LUMBAR (N/A, 2020). Restrictions  Restrictions/Precautions  Restrictions/Precautions: General Precautions, Surgical Protocols, Fall Risk  Required Braces or Orthoses?: No  Position Activity Restriction  Spinal Precautions: No Bending, No Lifting, No Twisting  Other position/activity restrictions: ambulate pt, up with assistance  Subjective   General  Chart Reviewed: Yes  Response To Previous Treatment: Patient with no complaints from previous session.   Family / Caregiver Present: No  Subjective  Subjective: Pt supine in bed RN and pt agreeable to therapy  Pain Screening  Patient Currently in Pain: Yes  Pain Assessment  Pain Assessment: 0-10  Pain Level: 6  Pain Type: Surgical pain  Pain Location: Back  Pain Orientation: Lower  Pain Descriptors: Aching;Discomfort  Pain Frequency: Continuous  Pain Onset: On-going Clinical Progression: Not changed  Functional Pain Assessment: Activities are not prevented  Non-Pharmaceutical Pain Intervention(s): Ambulation/Increased Activity  Vital Signs  Patient Currently in Pain: Yes       Orientation  Orientation  Overall Orientation Status: Within Normal Limits  Cognition      Objective   Bed mobility  Supine to Sit: Stand by assistance  Sit to Supine: Contact guard assistance  Comment: increased time and effort to complete due to pain  Transfers  Sit to Stand: Stand by assistance  Stand to sit: Stand by assistance  Comment: stood at [employer]  Ambulation  Ambulation?: Yes  More Ambulation?: Yes  Ambulation 1  Surface: level tile  Device: Rolling Walker  Assistance: Stand by assistance  Quality of Gait: decreased step length, decreased gait speed, decreased endurance, no LOB  Gait Deviations: Slow Joanne;Decreased step length  Distance: 300ft  Ambulation 2  Surface - 2: level tile  Device 2: No device  Assistance 2: Contact guard assistance  Gait Deviations: Decreased step length; Slow Joanne  Distance: 250'  Comments: slightly unsteady initially , but balance improved t/o     Balance  Posture: Good  Sitting - Static: Good  Sitting - Dynamic: Good  Standing - Static: Good;-  Standing - Dynamic: Fair;+  Comments: standing balance assessed with RW and without  Exercises  Seated LE exercise program: Long Arc Quads, hip abduction/adduction, heel/toe raises, and marches.  Reps: x15    Goals  Short term goals  Time Frame for Short term goals: 14 visits  Short term goal 1: Pt to ambulate 300ft modified independently with least restrictive device  Short term goal 2: Pt to sit <> stand transfer independently  Short term goal 3: Pt to demonstrate independent bed mobility  Short term goal 4: Pt to ascend/descend flight of stairs with rail modified independently to allow for access to bedroom level  Short term goal 5: Pt to demonstrate good standing balance to decrease risk of falls  Patient Goals Patient goals : Pt would like to get back home    Plan    Plan  Times per week: 5-6x  Times per day: Daily  Current Treatment Recommendations: Strengthening, Balance Training, Functional Mobility Training, Transfer Training, Endurance Training, Gait Training, Stair training, Patient/Caregiver Education & Training, Home Exercise Program, Safety Education & Training  Safety Devices  Type of devices: Call light within reach, Gait belt, Left in bed, Nurse notified  Restraints  Initially in place: No     Therapy Time   Individual Concurrent Group Co-treatment   Time In 0850         Time Out 0929         Minutes 39         Timed Code Treatment Minutes: [address] [place], [place] done

## 2021-09-01 ENCOUNTER — HOSPITAL ENCOUNTER (OUTPATIENT)
Age: 36
Setting detail: SPECIMEN
Discharge: HOME | End: 2021-09-01
Payer: MEDICARE

## 2021-09-01 LAB
ABSOLUTE EOS #: 0.28 K/UL (ref 0–0.44)
ABSOLUTE IMMATURE GRANULOCYTE: 0.08 K/UL (ref 0–0.3)
ABSOLUTE LYMPH #: 1.79 K/UL (ref 1.1–3.7)
ABSOLUTE MONO #: 0.52 K/UL (ref 0.1–1.2)
ALBUMIN SERPL-MCNC: 4 G/DL (ref 3.5–5.2)
ALBUMIN/GLOBULIN RATIO: 1.2 (ref 1–2.5)
ALP BLD-CCNC: 150 U/L (ref 40–129)
ALT SERPL-CCNC: 71 U/L (ref 5–41)
ANION GAP SERPL CALCULATED.3IONS-SCNC: 17 MMOL/L (ref 9–17)
AST SERPL-CCNC: 45 U/L
BASOPHILS # BLD: 1 % (ref 0–2)
BASOPHILS ABSOLUTE: 0.07 K/UL (ref 0–0.2)
BILIRUB SERPL-MCNC: 0.23 MG/DL (ref 0.3–1.2)
BUN BLDV-MCNC: 19 MG/DL (ref 6–20)
BUN/CREAT BLD: ABNORMAL (ref 9–20)
CALCIUM SERPL-MCNC: 9.2 MG/DL (ref 8.6–10.4)
CHLORIDE BLD-SCNC: 95 MMOL/L (ref 98–107)
CHOLESTEROL, FASTING: 175 MG/DL
CHOLESTEROL/HDL RATIO: 3.1
CO2: 19 MMOL/L (ref 20–31)
CREAT SERPL-MCNC: 0.75 MG/DL (ref 0.7–1.2)
DIFFERENTIAL TYPE: ABNORMAL
EOSINOPHILS RELATIVE PERCENT: 4 % (ref 1–4)
GFR AFRICAN AMERICAN: >60 ML/MIN
GFR NON-AFRICAN AMERICAN: >60 ML/MIN
GFR SERPL CREATININE-BSD FRML MDRD: ABNORMAL ML/MIN/{1.73_M2}
GFR SERPL CREATININE-BSD FRML MDRD: ABNORMAL ML/MIN/{1.73_M2}
GLUCOSE BLD-MCNC: 525 MG/DL (ref 70–99)
HCT VFR BLD CALC: 43.5 % (ref 40.7–50.3)
HDLC SERPL-MCNC: 56 MG/DL
HEMOGLOBIN: 14.5 G/DL (ref 13–17)
IMMATURE GRANULOCYTES: 1 %
LDL CHOLESTEROL: 99 MG/DL (ref 0–130)
LYMPHOCYTES # BLD: 23 % (ref 24–43)
MCH RBC QN AUTO: 29.4 PG (ref 25.2–33.5)
MCHC RBC AUTO-ENTMCNC: 33.3 G/DL (ref 28.4–34.8)
MCV RBC AUTO: 88.2 FL (ref 82.6–102.9)
MONOCYTES # BLD: 7 % (ref 3–12)
NRBC AUTOMATED: 0 PER 100 WBC
PDW BLD-RTO: 12.6 % (ref 11.8–14.4)
PLATELET # BLD: 329 K/UL (ref 138–453)
PLATELET ESTIMATE: ABNORMAL
PMV BLD AUTO: 11.1 FL (ref 8.1–13.5)
POTASSIUM SERPL-SCNC: 5.2 MMOL/L (ref 3.7–5.3)
RBC # BLD: 4.93 M/UL (ref 4.21–5.77)
RBC # BLD: ABNORMAL 10*6/UL
SEG NEUTROPHILS: 64 % (ref 36–65)
SEGMENTED NEUTROPHILS ABSOLUTE COUNT: 5.07 K/UL (ref 1.5–8.1)
SODIUM BLD-SCNC: 131 MMOL/L (ref 135–144)
TOTAL PROTEIN: 7.3 G/DL (ref 6.4–8.3)
TRIGLYCERIDE, FASTING: 102 MG/DL
TSH SERPL DL<=0.05 MIU/L-ACNC: 1.87 MIU/L (ref 0.3–5)
VLDLC SERPL CALC-MCNC: NORMAL MG/DL (ref 1–30)
WBC # BLD: 7.8 K/UL (ref 3.5–11.3)
WBC # BLD: ABNORMAL 10*3/UL

## 2021-09-04 LAB
HAV IGM SER IA-ACNC: NONREACTIVE
HEPATITIS B CORE IGM ANTIBODY: ABNORMAL
HEPATITIS B SURFACE ANTIGEN: REACTIVE
HEPATITIS C ANTIBODY: REACTIVE

## 2021-09-08 ENCOUNTER — HOSPITAL ENCOUNTER (OUTPATIENT)
Age: 36
Setting detail: SPECIMEN
Discharge: HOME | End: 2021-09-08
Payer: MEDICARE

## 2021-09-08 LAB
HIV AG/AB: NONREACTIVE
INR BLD: 1
PROTHROMBIN TIME: 10.4 SEC (ref 9.1–12.3)

## 2021-09-09 LAB
HAV AB SERPL IA-ACNC: NONREACTIVE
HBV SURFACE AB TITR SER: <3.5 MIU/ML
HEPATITIS B CORE IGM ANTIBODY: ABNORMAL
HEPATITIS B CORE TOTAL ANTIBODY: REACTIVE
HEPATITIS B SURFACE ANTIGEN: REACTIVE

## 2021-09-10 LAB
DIRECT EXAM: ABNORMAL
Lab: ABNORMAL
SPECIMEN DESCRIPTION: ABNORMAL

## 2021-09-13 LAB
HCV QUANTITATIVE: NORMAL
HEPATITIS C GENOTYPE: NORMAL

## (undated) DEVICE — SHEET, T, LAPAROTOMY, STERILE: Brand: MEDLINE

## (undated) DEVICE — GARMENT,MEDLINE,DVT,INT,CALF,MED, GEN2: Brand: MEDLINE

## (undated) DEVICE — SPONGE LAP W18XL18IN WHT COT 4 PLY FLD STRUNG RADPQ DISP ST

## (undated) DEVICE — Device

## (undated) DEVICE — GLOVE SURG SZ 65 THK91MIL LTX FREE SYN POLYISOPRENE

## (undated) DEVICE — 3M™ IOBAN™ 2 ANTIMICROBIAL INCISE DRAPE 6650EZ: Brand: IOBAN™ 2

## (undated) DEVICE — SYRINGE IRRIG 60ML SFT PLIABLE BLB EZ TO GRP 1 HND USE W/

## (undated) DEVICE — NEEDLE HYPO 25GA L1.5IN BLU POLYPR HUB S STL REG BVL STR

## (undated) DEVICE — PACK PROCEDURE SURG LUMBAR SPINE SVMMC

## (undated) DEVICE — AGENT HEMOSTATIC SURGIFLOW MATRIX KIT W/THROMBIN

## (undated) DEVICE — 1000 S-DRAPE TOWEL DRAPE 10/BX: Brand: STERI-DRAPE™

## (undated) DEVICE — MARKER,SKIN,WI/RULER AND LABELS: Brand: MEDLINE

## (undated) DEVICE — PATIENT RETURN ELECTRODE, SINGLE-USE, CONTACT QUALITY MONITORING, ADULT, WITH 9FT CORD, FOR PATIENTS WEIGING OVER 33LBS. (15KG): Brand: MEGADYNE

## (undated) DEVICE — HANDPIECE SET WITH COAXIAL HIGH FLOW TIP AND SUCTION TUBE: Brand: INTERPULSE

## (undated) DEVICE — DRESSING BORDERED ADH GZ UNIV GEN USE 8INX4IN AND 6INX2IN

## (undated) DEVICE — CATHETER FOL 2 W 16 FR 5 CC URETH SPEC TIEM BARDX IC

## (undated) DEVICE — SUTURE VCRL SZ 2-0 L27IN ABSRB UD L36MM CT-1 1/2 CIR JJ42G

## (undated) DEVICE — NEEDLE SPNL 18GA L3.5IN W/ QNCKE SHARPER BVL DURA CLICK

## (undated) DEVICE — 3.0MM PRECISION NEURO (MATCH HEAD)

## (undated) DEVICE — DRAPE,REIN 53X77,STERILE: Brand: MEDLINE

## (undated) DEVICE — SPONGE,NEURO,0.5"X0.5",XR,STRL,10/PK: Brand: MEDLINE

## (undated) DEVICE — TOTAL TRAY, 16FR 10ML SIL FOLEY, URN: Brand: MEDLINE

## (undated) DEVICE — 6.0MM ROUND FLUTED SOFT TOUCH

## (undated) DEVICE — SUTURE VCRL SZ 0 L18IN ABSRB VLT L36MM CT-1 1/2 CIR J740D

## (undated) DEVICE — KIT DRN FLAT W/ 100CC EVAC 7MM FULL PERF

## (undated) DEVICE — BIPOLAR SEALER 23-113-1 AQM 2.3 OM NEURO: Brand: AQUAMANTYS ®

## (undated) DEVICE — CONNECTOR TBNG WHT PLAS SUCT STR 5IN1 LTWT W/ M CONN

## (undated) DEVICE — INTENDED FOR TISSUE SEPARATION, AND OTHER PROCEDURES THAT REQUIRE A SHARP SURGICAL BLADE TO PUNCTURE OR CUT.: Brand: BARD-PARKER ® CARBON RIB-BACK BLADES

## (undated) DEVICE — GLOVE ORANGE PI 7   MSG9070

## (undated) DEVICE — APPLICATOR MEDICATED 26 CC SOLUTION HI LT ORNG CHLORAPREP

## (undated) DEVICE — ELECTRODE ELECSURG NDL 2.8 INX7.2 CM COAT INSUL EDGE

## (undated) DEVICE — DRAPE C ARM UNIV W41XL74IN CLR PLAS XR VELC CLSR POLY STRP

## (undated) DEVICE — ELECTRODE PT RET AD L9FT HI MOIST COND ADH HYDRGEL CORDED

## (undated) DEVICE — CATHETER FOL 2 W 14 FR 5 CC URETH SPEC TIEM BARDX IC

## (undated) DEVICE — SYRINGE MED 10ML TRNSLUC BRL PLUNG BLK MRK POLYPR CTRL

## (undated) DEVICE — GLOVE SURG SZ 6 THK91MIL LTX FREE SYN POLYISOPRENE ANTI